# Patient Record
Sex: FEMALE | Race: WHITE | Employment: PART TIME
[De-identification: names, ages, dates, MRNs, and addresses within clinical notes are randomized per-mention and may not be internally consistent; named-entity substitution may affect disease eponyms.]

---

## 2017-02-23 ENCOUNTER — OFFICE VISIT (OUTPATIENT)
Facility: CLINIC | Age: 32
End: 2017-02-23

## 2017-02-23 ENCOUNTER — HOSPITAL ENCOUNTER (OUTPATIENT)
Dept: GENERAL RADIOLOGY | Facility: CLINIC | Age: 32
Discharge: HOME | End: 2017-02-23
Payer: COMMERCIAL

## 2017-02-23 ENCOUNTER — HOSPITAL ENCOUNTER (OUTPATIENT)
Facility: CLINIC | Age: 32
Discharge: HOME | End: 2017-02-23
Payer: COMMERCIAL

## 2017-02-23 VITALS
DIASTOLIC BLOOD PRESSURE: 60 MMHG | SYSTOLIC BLOOD PRESSURE: 84 MMHG | HEIGHT: 64 IN | WEIGHT: 123 LBS | TEMPERATURE: 97.8 F | BODY MASS INDEX: 21 KG/M2

## 2017-02-23 DIAGNOSIS — M25.551 RIGHT HIP PAIN: Primary | ICD-10-CM

## 2017-02-23 DIAGNOSIS — M25.551 RIGHT HIP PAIN: ICD-10-CM

## 2017-02-23 PROCEDURE — 73502 X-RAY EXAM HIP UNI 2-3 VIEWS: CPT

## 2017-02-23 PROCEDURE — 99213 OFFICE O/P EST LOW 20 MIN: CPT | Performed by: NURSE PRACTITIONER

## 2017-04-21 ENCOUNTER — HOSPITAL ENCOUNTER (OUTPATIENT)
Facility: CLINIC | Age: 32
Discharge: HOME | End: 2017-04-21
Payer: COMMERCIAL

## 2017-04-21 ENCOUNTER — HOSPITAL ENCOUNTER (OUTPATIENT)
Dept: GENERAL RADIOLOGY | Facility: CLINIC | Age: 32
Discharge: HOME | End: 2017-04-21
Payer: COMMERCIAL

## 2017-04-21 DIAGNOSIS — M54.41 ACUTE RIGHT-SIDED LOW BACK PAIN WITH RIGHT-SIDED SCIATICA: ICD-10-CM

## 2017-04-21 PROCEDURE — 72110 X-RAY EXAM L-2 SPINE 4/>VWS: CPT

## 2017-04-28 ENCOUNTER — HOSPITAL ENCOUNTER (OUTPATIENT)
Dept: MRI IMAGING | Facility: CLINIC | Age: 32
Discharge: HOME | End: 2017-04-28
Payer: COMMERCIAL

## 2017-04-28 DIAGNOSIS — M25.551 RIGHT HIP PAIN: ICD-10-CM

## 2017-04-28 PROCEDURE — 73721 MRI JNT OF LWR EXTRE W/O DYE: CPT

## 2017-05-16 ENCOUNTER — OFFICE VISIT (OUTPATIENT)
Dept: PODIATRY | Age: 32
End: 2017-05-16
Payer: COMMERCIAL

## 2017-05-16 VITALS
HEIGHT: 64 IN | WEIGHT: 121 LBS | HEART RATE: 72 BPM | DIASTOLIC BLOOD PRESSURE: 71 MMHG | SYSTOLIC BLOOD PRESSURE: 119 MMHG | BODY MASS INDEX: 20.66 KG/M2

## 2017-05-16 DIAGNOSIS — M84.351A STRESS FRACTURE OF RIGHT FEMUR, INITIAL ENCOUNTER: Primary | ICD-10-CM

## 2017-05-16 DIAGNOSIS — R26.9 ABNORMAL GAIT: ICD-10-CM

## 2017-05-16 DIAGNOSIS — M21.70 LOWER LIMB LENGTH DIFFERENCE: ICD-10-CM

## 2017-05-16 PROCEDURE — 99203 OFFICE O/P NEW LOW 30 MIN: CPT | Performed by: PODIATRIST

## 2017-05-18 ENCOUNTER — HOSPITAL ENCOUNTER (OUTPATIENT)
Age: 32
Setting detail: SPECIMEN
Discharge: HOME | End: 2017-05-18
Payer: COMMERCIAL

## 2017-05-18 DIAGNOSIS — M84.351A STRESS FRACTURE OF RIGHT FEMUR, INITIAL ENCOUNTER: ICD-10-CM

## 2017-05-18 LAB
CALCIUM SERPL-MCNC: 9 MG/DL (ref 8.6–10.4)
VITAMIN D 25-HYDROXY: 25.8 NG/ML (ref 30–100)

## 2017-05-19 ENCOUNTER — TELEPHONE (OUTPATIENT)
Dept: PODIATRY | Age: 32
End: 2017-05-19

## 2017-06-05 ENCOUNTER — OFFICE VISIT (OUTPATIENT)
Dept: ORTHOPEDIC SURGERY | Age: 32
End: 2017-06-05
Payer: COMMERCIAL

## 2017-06-05 VITALS
SYSTOLIC BLOOD PRESSURE: 118 MMHG | HEIGHT: 64 IN | HEART RATE: 70 BPM | OXYGEN SATURATION: 99 % | DIASTOLIC BLOOD PRESSURE: 74 MMHG | WEIGHT: 119 LBS | BODY MASS INDEX: 20.32 KG/M2

## 2017-06-05 DIAGNOSIS — M84.351A STRESS FRACTURE OF RIGHT FEMUR, INITIAL ENCOUNTER: Primary | ICD-10-CM

## 2017-06-05 PROCEDURE — 99203 OFFICE O/P NEW LOW 30 MIN: CPT | Performed by: FAMILY MEDICINE

## 2017-06-13 ENCOUNTER — HOSPITAL ENCOUNTER (OUTPATIENT)
Dept: PHYSICAL THERAPY | Facility: CLINIC | Age: 32
Setting detail: THERAPIES SERIES
Discharge: HOME | End: 2017-06-13
Payer: COMMERCIAL

## 2017-06-13 ENCOUNTER — TELEPHONE (OUTPATIENT)
Dept: ORTHOPEDIC SURGERY | Age: 32
End: 2017-06-13

## 2017-06-13 PROCEDURE — 97140 MANUAL THERAPY 1/> REGIONS: CPT

## 2017-06-13 PROCEDURE — 97161 PT EVAL LOW COMPLEX 20 MIN: CPT

## 2017-06-22 ENCOUNTER — HOSPITAL ENCOUNTER (OUTPATIENT)
Dept: PHYSICAL THERAPY | Facility: CLINIC | Age: 32
Setting detail: THERAPIES SERIES
Discharge: HOME | End: 2017-06-22
Payer: COMMERCIAL

## 2017-06-22 PROCEDURE — 97750 PHYSICAL PERFORMANCE TEST: CPT

## 2017-06-22 PROCEDURE — 97112 NEUROMUSCULAR REEDUCATION: CPT

## 2017-07-10 ENCOUNTER — HOSPITAL ENCOUNTER (OUTPATIENT)
Dept: PHYSICAL THERAPY | Facility: CLINIC | Age: 32
Setting detail: THERAPIES SERIES
Discharge: HOME | End: 2017-07-10
Payer: COMMERCIAL

## 2017-07-10 PROCEDURE — 97112 NEUROMUSCULAR REEDUCATION: CPT

## 2017-07-10 PROCEDURE — 97140 MANUAL THERAPY 1/> REGIONS: CPT

## 2017-07-18 ENCOUNTER — HOSPITAL ENCOUNTER (OUTPATIENT)
Dept: PHYSICAL THERAPY | Facility: CLINIC | Age: 32
Setting detail: THERAPIES SERIES
Discharge: HOME | End: 2017-07-18
Payer: COMMERCIAL

## 2017-07-18 PROCEDURE — 97110 THERAPEUTIC EXERCISES: CPT

## 2017-07-18 PROCEDURE — 97112 NEUROMUSCULAR REEDUCATION: CPT

## 2017-08-03 ENCOUNTER — HOSPITAL ENCOUNTER (OUTPATIENT)
Dept: PHYSICAL THERAPY | Facility: CLINIC | Age: 32
Setting detail: THERAPIES SERIES
Discharge: HOME | End: 2017-08-03
Payer: COMMERCIAL

## 2017-08-03 PROCEDURE — 97110 THERAPEUTIC EXERCISES: CPT

## 2017-08-15 ENCOUNTER — APPOINTMENT (OUTPATIENT)
Dept: PHYSICAL THERAPY | Facility: CLINIC | Age: 32
End: 2017-08-15
Payer: COMMERCIAL

## 2017-08-18 ENCOUNTER — APPOINTMENT (OUTPATIENT)
Dept: PHYSICAL THERAPY | Facility: CLINIC | Age: 32
End: 2017-08-18
Payer: COMMERCIAL

## 2017-08-29 ENCOUNTER — HOSPITAL ENCOUNTER (OUTPATIENT)
Dept: PHYSICAL THERAPY | Facility: CLINIC | Age: 32
Setting detail: THERAPIES SERIES
Discharge: HOME | End: 2017-08-29
Payer: COMMERCIAL

## 2017-08-29 PROCEDURE — 97140 MANUAL THERAPY 1/> REGIONS: CPT

## 2017-09-11 ENCOUNTER — HOSPITAL ENCOUNTER (OUTPATIENT)
Dept: PHYSICAL THERAPY | Facility: CLINIC | Age: 32
Setting detail: THERAPIES SERIES
Discharge: HOME | End: 2017-09-11
Payer: COMMERCIAL

## 2017-10-13 ENCOUNTER — HOSPITAL ENCOUNTER (OUTPATIENT)
Age: 32
Setting detail: SPECIMEN
Discharge: HOME | End: 2017-10-13
Payer: COMMERCIAL

## 2017-10-13 ENCOUNTER — OFFICE VISIT (OUTPATIENT)
Dept: OBGYN CLINIC | Age: 32
End: 2017-10-13
Payer: COMMERCIAL

## 2017-10-13 VITALS
WEIGHT: 120.6 LBS | DIASTOLIC BLOOD PRESSURE: 75 MMHG | SYSTOLIC BLOOD PRESSURE: 104 MMHG | BODY MASS INDEX: 20.59 KG/M2 | HEIGHT: 64 IN | HEART RATE: 74 BPM

## 2017-10-13 DIAGNOSIS — Z30.09 ENCOUNTER FOR EDUCATION ABOUT CONTRACEPTIVE USE: ICD-10-CM

## 2017-10-13 DIAGNOSIS — Z01.419 ENCOUNTER FOR WELL WOMAN EXAM WITH ROUTINE GYNECOLOGICAL EXAM: Primary | ICD-10-CM

## 2017-10-13 DIAGNOSIS — Z11.51 SCREENING FOR HPV (HUMAN PAPILLOMAVIRUS): ICD-10-CM

## 2017-10-13 PROCEDURE — 99395 PREV VISIT EST AGE 18-39: CPT | Performed by: ADVANCED PRACTICE MIDWIFE

## 2017-10-13 RX ORDER — DROSPIRENONE AND ETHINYL ESTRADIOL 0.03MG-3MG
1 KIT ORAL DAILY
Qty: 1 PACKET | Refills: 3 | Status: SHIPPED | OUTPATIENT
Start: 2017-10-13 | End: 2017-11-16 | Stop reason: SDUPTHER

## 2017-10-13 NOTE — PROGRESS NOTES
Subjective:  Krista Helton is a 34 y.o. female who presents for an annual exam.     HPI:  The patient has no complaints today. Preventative Health Screening:  The patient is sexually active. No new partners. last pap: was normal       HPV typing: unknown  Date   Regular exercise: yes Type:running, frequency: 3 times per week, Explored exercise options no, time is an issue  Ever been transfused or tattooed?no  The patient reports that domestic violence in her life is absent  Wears seatbelts: yes  Smoking: no  Alcohol use: occasionally  Recreational drug use: no  Happy with life: yes, satisfied with how she handles stress: yes, some issues w cycles, desires to work on this. Discussed OCP and will try for 3 weeks. Family planning choices: condoms  Desires pregnancy in the next year: uncertain if done or not  BRCA testing: no  Date of last DEXA:no  Date of last Mammogram: no  Colonscopy screening completed: no  History of gestational diabetes: no     Weight management: no issues  Diet review: no    Gynecological history:  OB History    Para Term  AB Living   3 2 2     2   SAB TAB Ectopic Molar Multiple Live Births             1      # Outcome Date GA Lbr Alex/2nd Weight Sex Delivery Anes PTL Lv   3             2 Term 11   8 lb 7 oz (3.827 kg) F Vag-Spont   CURTIS      Birth Comments: System Generated. Please review and update pregnancy details. 1 Term 11/29/10 41w2d 12:00 8 lb 5 oz (3.771 kg) M Vag-Spont EPI            Menarche age: teen      Cycle regular monthly, days 8-9, flow 4 heavy   Patient's last menstrual period was 2017. Menopause age: no                   Patient's medications, allergies, past medical, surgical, social and family histories were reviewed and updated as appropriate. Hormone Replacement: no    STD history: no      Review of Systems:  Constitutional: No fever, chills or malaise. No unexplained weight changes.   Head and eyes: No headache, dizziness or trauma. No visual changes. Wears eyewear yes  ENT: No hearing loss, tinnitus, sinus or taste problems. Hematologic: No lymphedema, Von Willebrands's, hemophilia or bruising. Psychological: No depression, suicidal thoughts, crying or anxiety. Breast: No skin changes, masses, mastalgia or discharge. Respiratory: No SOB, coughing or wheezing. Cardiovascular: No chest pain with exertion, palpitations, syncope or edema. Gastrointestinal: No heartburn, nausea or vomiting, bloody stools. No concerning change in bowel pattern. Genitourinary: No dysuria, hematuria, dyspareunia or abnormal bleeding  Musculoskeletal: No arthralgia, gout, or muscle weakness. Neurologic: No CVA, migraines, seizures, syncope or numbness. Dermatologic:  No rashes, itching or hives  Lymphatic:  No adneopathy    Objective:   /75 (Position: Sitting)   Pulse 74   Ht 5' 4\" (1.626 m)   Wt 120 lb 9.6 oz (54.7 kg)   LMP 09/13/2017   BMI 20.70 kg/m²     General Appearance:    Alert, cooperative, no distress, appears stated age   Head:    Normocephalic, without obvious abnormality, atraumatic   Eyes:    Conjunctiva/corneas clear   Ears:    Normal external ear canals, both ears   Nose:   Nares normal, septum midline, no drainage or sinus tenderness   Throat:   Lips, mucosa, and tongue normal; teeth and gums normal   Neck:   Supple, symmetrical, trachea midline, no adenopathy;     thyroid:  no enlargement/tenderness/nodules   Back:     Symmetric, no curvature, ROM normal, no CVA tenderness   Lungs:     Clear to auscultation bilaterally, respirations unlabored. No wheezes audible. Chest Wall:    No tenderness or deformity    Heart:    Regular rate and rhythm, S1 and S2 normal, no murmur, rub   or gallop   Breast Exam:    No tenderness, masses, or nipple abnormality. Breasts are symmetrical. Self breast exam reviewed. Abdomen:     Soft, non-tender, no masses, no organomegaly. No guarding or rebound. No rigidity.  No hernias

## 2017-10-18 LAB
HPV SAMPLE: NORMAL
HPV SOURCE: NORMAL
HPV, GENOTYPE 16: NOT DETECTED
HPV, GENOTYPE 18: NOT DETECTED
HPV, HIGH RISK OTHER: NOT DETECTED
HPV, INTERPRETATION: NORMAL

## 2017-10-19 LAB — CYTOLOGY REPORT: NORMAL

## 2017-11-16 DIAGNOSIS — Z30.09 ENCOUNTER FOR EDUCATION ABOUT CONTRACEPTIVE USE: ICD-10-CM

## 2017-11-16 RX ORDER — DROSPIRENONE AND ETHINYL ESTRADIOL 0.03MG-3MG
1 KIT ORAL DAILY
Qty: 3 PACKET | Refills: 3 | Status: SHIPPED | OUTPATIENT
Start: 2017-11-16 | End: 2018-01-09 | Stop reason: SDUPTHER

## 2017-11-28 ENCOUNTER — HOSPITAL ENCOUNTER (OUTPATIENT)
Age: 32
Setting detail: SPECIMEN
Discharge: HOME | End: 2017-11-28
Payer: COMMERCIAL

## 2017-11-28 DIAGNOSIS — Z00.00 WELL ADULT EXAM: ICD-10-CM

## 2017-11-28 LAB
ALBUMIN SERPL-MCNC: 4.4 G/DL (ref 3.5–5.2)
ALBUMIN/GLOBULIN RATIO: 1.5 (ref 1–2.5)
ALP BLD-CCNC: 28 U/L (ref 35–104)
ALT SERPL-CCNC: 16 U/L (ref 5–33)
ANION GAP SERPL CALCULATED.3IONS-SCNC: 12 MMOL/L (ref 9–17)
AST SERPL-CCNC: 17 U/L
BILIRUB SERPL-MCNC: 0.91 MG/DL (ref 0.3–1.2)
BUN BLDV-MCNC: 8 MG/DL (ref 6–20)
BUN/CREAT BLD: ABNORMAL (ref 9–20)
CALCIUM SERPL-MCNC: 9.2 MG/DL (ref 8.6–10.4)
CHLORIDE BLD-SCNC: 99 MMOL/L (ref 98–107)
CHOLESTEROL/HDL RATIO: 2.5
CHOLESTEROL: 152 MG/DL
CO2: 26 MMOL/L (ref 20–31)
CREAT SERPL-MCNC: 0.75 MG/DL (ref 0.5–0.9)
GFR AFRICAN AMERICAN: >60 ML/MIN
GFR NON-AFRICAN AMERICAN: >60 ML/MIN
GFR SERPL CREATININE-BSD FRML MDRD: ABNORMAL ML/MIN/{1.73_M2}
GFR SERPL CREATININE-BSD FRML MDRD: ABNORMAL ML/MIN/{1.73_M2}
GLUCOSE BLD-MCNC: 101 MG/DL (ref 70–99)
HCT VFR BLD CALC: 41.7 % (ref 36.3–47.1)
HDLC SERPL-MCNC: 61 MG/DL
HEMOGLOBIN: 13.6 G/DL (ref 11.9–15.1)
LDL CHOLESTEROL: 72 MG/DL (ref 0–130)
MCH RBC QN AUTO: 29.1 PG (ref 25.2–33.5)
MCHC RBC AUTO-ENTMCNC: 32.6 G/DL (ref 28.4–34.8)
MCV RBC AUTO: 89.3 FL (ref 82.6–102.9)
PDW BLD-RTO: 11.9 % (ref 11.8–14.4)
PLATELET # BLD: 248 K/UL (ref 138–453)
PMV BLD AUTO: 10.6 FL (ref 8.1–13.5)
POTASSIUM SERPL-SCNC: 3.8 MMOL/L (ref 3.7–5.3)
RBC # BLD: 4.67 M/UL (ref 3.95–5.11)
SODIUM BLD-SCNC: 137 MMOL/L (ref 135–144)
TOTAL PROTEIN: 7.3 G/DL (ref 6.4–8.3)
TRIGL SERPL-MCNC: 96 MG/DL
VLDLC SERPL CALC-MCNC: NORMAL MG/DL (ref 1–30)
WBC # BLD: 6.1 K/UL (ref 3.5–11.3)

## 2018-01-09 DIAGNOSIS — Z30.09 ENCOUNTER FOR EDUCATION ABOUT CONTRACEPTIVE USE: ICD-10-CM

## 2018-01-09 RX ORDER — DROSPIRENONE AND ETHINYL ESTRADIOL 0.03MG-3MG
1 KIT ORAL DAILY
Qty: 3 PACKET | Refills: 3 | Status: SHIPPED | OUTPATIENT
Start: 2018-01-09 | End: 2018-12-13 | Stop reason: SDUPTHER

## 2018-11-28 ENCOUNTER — HOSPITAL ENCOUNTER (OUTPATIENT)
Age: 33
Setting detail: SPECIMEN
Discharge: HOME | End: 2018-11-28
Payer: COMMERCIAL

## 2018-11-28 DIAGNOSIS — Z00.00 WELL ADULT EXAM: ICD-10-CM

## 2018-11-28 DIAGNOSIS — Z13.6 SCREENING FOR CARDIOVASCULAR CONDITION: ICD-10-CM

## 2018-11-28 LAB
ABSOLUTE EOS #: 0.22 K/UL (ref 0–0.44)
ABSOLUTE IMMATURE GRANULOCYTE: <0.03 K/UL (ref 0–0.3)
ABSOLUTE LYMPH #: 1.46 K/UL (ref 1.1–3.7)
ABSOLUTE MONO #: 0.57 K/UL (ref 0.1–1.2)
ALBUMIN SERPL-MCNC: 4.2 G/DL (ref 3.5–5.2)
ALBUMIN/GLOBULIN RATIO: 1.4 (ref 1–2.5)
ALP BLD-CCNC: 35 U/L (ref 35–104)
ALT SERPL-CCNC: 16 U/L (ref 5–33)
ANION GAP SERPL CALCULATED.3IONS-SCNC: 10 MMOL/L (ref 9–17)
AST SERPL-CCNC: 19 U/L
BASOPHILS # BLD: 1 % (ref 0–2)
BASOPHILS ABSOLUTE: 0.04 K/UL (ref 0–0.2)
BILIRUB SERPL-MCNC: 0.85 MG/DL (ref 0.3–1.2)
BUN BLDV-MCNC: 8 MG/DL (ref 6–20)
BUN/CREAT BLD: NORMAL (ref 9–20)
CALCIUM SERPL-MCNC: 9.1 MG/DL (ref 8.6–10.4)
CHLORIDE BLD-SCNC: 101 MMOL/L (ref 98–107)
CHOLESTEROL/HDL RATIO: 2.5
CHOLESTEROL: 147 MG/DL
CO2: 27 MMOL/L (ref 20–31)
CREAT SERPL-MCNC: 0.7 MG/DL (ref 0.5–0.9)
DIFFERENTIAL TYPE: ABNORMAL
EOSINOPHILS RELATIVE PERCENT: 3 % (ref 1–4)
GFR AFRICAN AMERICAN: >60 ML/MIN
GFR NON-AFRICAN AMERICAN: >60 ML/MIN
GFR SERPL CREATININE-BSD FRML MDRD: NORMAL ML/MIN/{1.73_M2}
GFR SERPL CREATININE-BSD FRML MDRD: NORMAL ML/MIN/{1.73_M2}
GLUCOSE BLD-MCNC: 99 MG/DL (ref 70–99)
HCT VFR BLD CALC: 39.3 % (ref 36.3–47.1)
HDLC SERPL-MCNC: 59 MG/DL
HEMOGLOBIN: 12.8 G/DL (ref 11.9–15.1)
IMMATURE GRANULOCYTES: 0 %
LDL CHOLESTEROL: 64 MG/DL (ref 0–130)
LYMPHOCYTES # BLD: 22 % (ref 24–43)
MCH RBC QN AUTO: 29.2 PG (ref 25.2–33.5)
MCHC RBC AUTO-ENTMCNC: 32.6 G/DL (ref 28.4–34.8)
MCV RBC AUTO: 89.7 FL (ref 82.6–102.9)
MONOCYTES # BLD: 9 % (ref 3–12)
NRBC AUTOMATED: 0 PER 100 WBC
PDW BLD-RTO: 11.8 % (ref 11.8–14.4)
PLATELET # BLD: 240 K/UL (ref 138–453)
PLATELET ESTIMATE: ABNORMAL
PMV BLD AUTO: 10.8 FL (ref 8.1–13.5)
POTASSIUM SERPL-SCNC: 4.5 MMOL/L (ref 3.7–5.3)
RBC # BLD: 4.38 M/UL (ref 3.95–5.11)
RBC # BLD: ABNORMAL 10*6/UL
SEG NEUTROPHILS: 65 % (ref 36–65)
SEGMENTED NEUTROPHILS ABSOLUTE COUNT: 4.22 K/UL (ref 1.5–8.1)
SODIUM BLD-SCNC: 138 MMOL/L (ref 135–144)
TOTAL PROTEIN: 7.2 G/DL (ref 6.4–8.3)
TRIGL SERPL-MCNC: 118 MG/DL
VLDLC SERPL CALC-MCNC: NORMAL MG/DL (ref 1–30)
WBC # BLD: 6.5 K/UL (ref 3.5–11.3)
WBC # BLD: ABNORMAL 10*3/UL

## 2018-12-13 DIAGNOSIS — Z30.09 ENCOUNTER FOR EDUCATION ABOUT CONTRACEPTIVE USE: ICD-10-CM

## 2018-12-14 RX ORDER — DROSPIRENONE AND ETHINYL ESTRADIOL 0.03MG-3MG
KIT ORAL
Qty: 84 TABLET | Refills: 0 | Status: SHIPPED | OUTPATIENT
Start: 2018-12-14 | End: 2019-04-11 | Stop reason: ALTCHOICE

## 2019-04-11 ENCOUNTER — OFFICE VISIT (OUTPATIENT)
Dept: OBGYN CLINIC | Age: 34
End: 2019-04-11
Payer: COMMERCIAL

## 2019-04-11 VITALS
WEIGHT: 123 LBS | DIASTOLIC BLOOD PRESSURE: 67 MMHG | HEIGHT: 64 IN | HEART RATE: 84 BPM | BODY MASS INDEX: 21 KG/M2 | SYSTOLIC BLOOD PRESSURE: 114 MMHG

## 2019-04-11 DIAGNOSIS — N94.6 DYSMENORRHEA: ICD-10-CM

## 2019-04-11 DIAGNOSIS — Z01.419 WOMEN'S ANNUAL ROUTINE GYNECOLOGICAL EXAMINATION: Primary | ICD-10-CM

## 2019-04-11 PROCEDURE — 99395 PREV VISIT EST AGE 18-39: CPT | Performed by: ADVANCED PRACTICE MIDWIFE

## 2019-04-11 RX ORDER — DROSPIRENONE AND ETHINYL ESTRADIOL 0.03MG-3MG
1 KIT ORAL DAILY
Qty: 3 PACKET | Refills: 3 | Status: SHIPPED | OUTPATIENT
Start: 2019-04-11 | End: 2019-10-22

## 2019-04-11 ASSESSMENT — ENCOUNTER SYMPTOMS
SHORTNESS OF BREATH: 0
DIARRHEA: 0
ABDOMINAL PAIN: 0
NAUSEA: 0
VOMITING: 0

## 2019-04-11 NOTE — PROGRESS NOTES
[address]  [facility]  [address]  [address]  Dept: [phone]    Patient Name: Mehdi More  Patient Age: 27 y.o. Date of Visit: 4/11/2019    Subjective  Chief Complaint   Patient presents with    Gynecologic Exam     last pap 10/13/17 wnl hpv neg     Patient's last menstrual period was 04/02/2019. Arrives for annual with daughter. Doing well. No breast complaints  Reports heavy painful periods. Would like to go back on OCP. Reports previously been on OCP without concerns. Reports unsure if desires more children. Reports sexually active with on male sex partner () reports no concerns with sex    She reports there is a personal history or family history of:    Smoking (> 15 cigs/day): No    Migraine with Aura:  No    HTN (> 160/100): No    DVT:  No    Thrombophilias:  No    Stroke (CVA): No     Ischemic heart disease:  No    Valvular heart disease (A Fib, Pul HTN, etc): No    Positive Antiphospholipid Abs:  No    Liver Disease:  No        Review of Systems   Constitutional: Negative for unexpected weight change. Respiratory: Negative for shortness of breath. Cardiovascular: Negative for chest pain, palpitations and leg swelling. Gastrointestinal: Negative for abdominal pain, diarrhea, nausea and vomiting. Genitourinary: Positive for menstrual problem. Negative for difficulty urinating, dyspareunia, vaginal discharge and vaginal pain. Neurological: Negative for dizziness, light-headedness and headaches.        Preventive Health Screening:   Date of last pap: 2017 normal               HPV typing/date: 2017  positive    History of Gestational Diabetes: No     If Yes, Glucose screening ordered:No    Preventive screening: No    Family history of Breast, Ovarian, Colon or Uterine Cancer:  no     If Yes see scanned worksheet    Objective  /67 (Site: Right Upper Arm, Position: Sitting, Cuff Size: Medium Adult)   Pulse 84   Ht 5' 4\" (1.626 m)   Wt 123 lb (55.8 kg)   LMP 04/02/2019   BMI 21.11 kg/m²     Gynecologic History  Menarche: 11  Monthly menses (days): irregular   Length: 7  Flow: heavy      Sexually active: Yes  New Sex Partner within 3 months: No  Domestic Violence Screening: negative    STD history: No     Birth control method :Yes ocp      Physical Exam   Constitutional: She is oriented to person, place, and time. She appears well-developed and well-nourished. No distress. Neck: Normal range of motion. No thyromegaly present. Cardiovascular: Normal rate and regular rhythm. Pulmonary/Chest: Effort normal and breath sounds normal. No respiratory distress. Genitourinary:   Genitourinary Comments: deferred   Musculoskeletal: Normal range of motion. Neurological: She is alert and oriented to person, place, and time. Skin: Skin is warm and dry. She is not diaphoretic. Psychiatric: She has a normal mood and affect. Her behavior is normal. Judgment and thought content normal.   Vitals reviewed. Assessment & Plan  1. Women's annual routine gynecological examination  -Pap per ASCCP guideline  -Mammogram at 39  -Recommended seeing PCP yearly  -Reviewed if stopping OCP to conceive to start folic acid    2. Dysmenorrhea  -Reviewed quick start, starting with next menses, or Sunday after next menses, discussed using protection x7 days after starting OCP if using quick start of Sunday start. Patient was educated to notify office and seek medical care if abdominal pain, chest pain, severe headaches, eye problems/loss of vision, or severe leg pain or swelling in the calf occurs (ACHES). - ELISEO 28 3-0.03 MG TABS; Take 1 tablet by mouth daily  Dispense: 3 packet; Refill: 3      Return in about 1 year (around 4/11/2020) for Annual.    Patient was seen with total face to face time of 25 minutes. More than 50% of this visit was on counseling and education regardingher diagnoses and her options.  She was also counseled on her preventative health maintenance recommendations and follow-up.     Electronically Signed by Kade Mello

## 2019-04-18 ENCOUNTER — TELEPHONE (OUTPATIENT)
Dept: OBGYN CLINIC | Age: 34
End: 2019-04-18

## 2019-04-29 ENCOUNTER — HOSPITAL ENCOUNTER (EMERGENCY)
Age: 34
Discharge: HOME | End: 2019-04-29
Attending: EMERGENCY MEDICINE
Payer: COMMERCIAL

## 2019-04-29 ENCOUNTER — APPOINTMENT (OUTPATIENT)
Dept: CT IMAGING | Age: 34
End: 2019-04-29
Payer: COMMERCIAL

## 2019-04-29 VITALS
RESPIRATION RATE: 18 BRPM | DIASTOLIC BLOOD PRESSURE: 52 MMHG | SYSTOLIC BLOOD PRESSURE: 99 MMHG | HEIGHT: 64 IN | HEART RATE: 68 BPM | BODY MASS INDEX: 20.83 KG/M2 | TEMPERATURE: 98.4 F | WEIGHT: 122 LBS | OXYGEN SATURATION: 99 %

## 2019-04-29 DIAGNOSIS — N83.201 CYST OF RIGHT OVARY: ICD-10-CM

## 2019-04-29 DIAGNOSIS — R10.31 RIGHT LOWER QUADRANT ABDOMINAL PAIN: Primary | ICD-10-CM

## 2019-04-29 LAB
-: ABNORMAL
ABSOLUTE EOS #: 0.2 K/UL (ref 0–0.4)
ABSOLUTE IMMATURE GRANULOCYTE: ABNORMAL K/UL (ref 0–0.3)
ABSOLUTE LYMPH #: 1.2 K/UL (ref 1–4.8)
ABSOLUTE MONO #: 0.7 K/UL (ref 0.1–1.3)
ALBUMIN SERPL-MCNC: 4.6 G/DL (ref 3.5–5.2)
ALBUMIN/GLOBULIN RATIO: ABNORMAL (ref 1–2.5)
ALP BLD-CCNC: 37 U/L (ref 35–104)
ALT SERPL-CCNC: 19 U/L (ref 5–33)
AMORPHOUS: ABNORMAL
ANION GAP SERPL CALCULATED.3IONS-SCNC: 14 MMOL/L (ref 9–17)
AST SERPL-CCNC: 20 U/L
BACTERIA: ABNORMAL
BASOPHILS # BLD: 0 % (ref 0–2)
BASOPHILS ABSOLUTE: 0 K/UL (ref 0–0.2)
BILIRUB SERPL-MCNC: 0.76 MG/DL (ref 0.3–1.2)
BILIRUBIN URINE: NEGATIVE
BUN BLDV-MCNC: 7 MG/DL (ref 6–20)
BUN/CREAT BLD: ABNORMAL (ref 9–20)
CALCIUM SERPL-MCNC: 8.7 MG/DL (ref 8.6–10.4)
CASTS UA: ABNORMAL /LPF
CHLORIDE BLD-SCNC: 102 MMOL/L (ref 98–107)
CO2: 21 MMOL/L (ref 20–31)
COLOR: YELLOW
COMMENT UA: ABNORMAL
CREAT SERPL-MCNC: 0.6 MG/DL (ref 0.5–0.9)
CRYSTALS, UA: ABNORMAL /HPF
CRYSTALS, UA: ABNORMAL /HPF
DIFFERENTIAL TYPE: ABNORMAL
EOSINOPHILS RELATIVE PERCENT: 2 % (ref 0–4)
EPITHELIAL CELLS UA: ABNORMAL /HPF
GFR AFRICAN AMERICAN: >60 ML/MIN
GFR NON-AFRICAN AMERICAN: >60 ML/MIN
GFR SERPL CREATININE-BSD FRML MDRD: ABNORMAL ML/MIN/{1.73_M2}
GFR SERPL CREATININE-BSD FRML MDRD: ABNORMAL ML/MIN/{1.73_M2}
GLUCOSE BLD-MCNC: 126 MG/DL (ref 70–99)
GLUCOSE URINE: NEGATIVE
HCG QUALITATIVE: NEGATIVE
HCT VFR BLD CALC: 43.3 % (ref 36–46)
HEMOGLOBIN: 14.6 G/DL (ref 12–16)
IMMATURE GRANULOCYTES: ABNORMAL %
KETONES, URINE: NEGATIVE
LEUKOCYTE ESTERASE, URINE: NEGATIVE
LIPASE: 35 U/L (ref 13–60)
LYMPHOCYTES # BLD: 12 % (ref 24–44)
MCH RBC QN AUTO: 29.6 PG (ref 26–34)
MCHC RBC AUTO-ENTMCNC: 33.6 G/DL (ref 31–37)
MCV RBC AUTO: 88.1 FL (ref 80–100)
MONOCYTES # BLD: 7 % (ref 1–7)
MUCUS: ABNORMAL
NITRITE, URINE: NEGATIVE
NRBC AUTOMATED: ABNORMAL PER 100 WBC
OTHER OBSERVATIONS UA: ABNORMAL
PDW BLD-RTO: 12.6 % (ref 11.5–14.9)
PH UA: 5 (ref 5–8)
PLATELET # BLD: 249 K/UL (ref 150–450)
PLATELET ESTIMATE: ABNORMAL
PMV BLD AUTO: 8.5 FL (ref 6–12)
POTASSIUM SERPL-SCNC: 3.6 MMOL/L (ref 3.7–5.3)
PROTEIN UA: NEGATIVE
RBC # BLD: 4.91 M/UL (ref 4–5.2)
RBC # BLD: ABNORMAL 10*6/UL
RBC UA: ABNORMAL /HPF
RENAL EPITHELIAL, UA: ABNORMAL /HPF
SEG NEUTROPHILS: 79 % (ref 36–66)
SEGMENTED NEUTROPHILS ABSOLUTE COUNT: 8.4 K/UL (ref 1.3–9.1)
SODIUM BLD-SCNC: 137 MMOL/L (ref 135–144)
SPECIFIC GRAVITY UA: 1.02 (ref 1–1.03)
TOTAL PROTEIN: 7.6 G/DL (ref 6.4–8.3)
TRICHOMONAS: ABNORMAL
TURBIDITY: ABNORMAL
URINE HGB: NEGATIVE
UROBILINOGEN, URINE: NORMAL
WBC # BLD: 10.5 K/UL (ref 3.5–11)
WBC # BLD: ABNORMAL 10*3/UL
WBC UA: ABNORMAL /HPF
YEAST: ABNORMAL

## 2019-04-29 PROCEDURE — 81001 URINALYSIS AUTO W/SCOPE: CPT

## 2019-04-29 PROCEDURE — 96374 THER/PROPH/DIAG INJ IV PUSH: CPT

## 2019-04-29 PROCEDURE — 84703 CHORIONIC GONADOTROPIN ASSAY: CPT

## 2019-04-29 PROCEDURE — 85025 COMPLETE CBC W/AUTO DIFF WBC: CPT

## 2019-04-29 PROCEDURE — 99284 EMERGENCY DEPT VISIT MOD MDM: CPT

## 2019-04-29 PROCEDURE — 6360000004 HC RX CONTRAST MEDICATION: Performed by: EMERGENCY MEDICINE

## 2019-04-29 PROCEDURE — 2580000003 HC RX 258: Performed by: EMERGENCY MEDICINE

## 2019-04-29 PROCEDURE — 36415 COLL VENOUS BLD VENIPUNCTURE: CPT

## 2019-04-29 PROCEDURE — 6360000002 HC RX W HCPCS: Performed by: EMERGENCY MEDICINE

## 2019-04-29 PROCEDURE — 83690 ASSAY OF LIPASE: CPT

## 2019-04-29 PROCEDURE — 74177 CT ABD & PELVIS W/CONTRAST: CPT

## 2019-04-29 PROCEDURE — 80053 COMPREHEN METABOLIC PANEL: CPT

## 2019-04-29 RX ORDER — 0.9 % SODIUM CHLORIDE 0.9 %
80 INTRAVENOUS SOLUTION INTRAVENOUS ONCE
Status: COMPLETED | OUTPATIENT
Start: 2019-04-29 | End: 2019-04-29

## 2019-04-29 RX ORDER — ONDANSETRON 2 MG/ML
4 INJECTION INTRAMUSCULAR; INTRAVENOUS ONCE
Status: COMPLETED | OUTPATIENT
Start: 2019-04-29 | End: 2019-04-29

## 2019-04-29 RX ORDER — DICYCLOMINE HYDROCHLORIDE 10 MG/1
10 CAPSULE ORAL EVERY 6 HOURS PRN
Qty: 20 CAPSULE | Refills: 0 | Status: SHIPPED | OUTPATIENT
Start: 2019-04-29 | End: 2019-10-22

## 2019-04-29 RX ORDER — 0.9 % SODIUM CHLORIDE 0.9 %
1000 INTRAVENOUS SOLUTION INTRAVENOUS ONCE
Status: COMPLETED | OUTPATIENT
Start: 2019-04-29 | End: 2019-04-29

## 2019-04-29 RX ORDER — ONDANSETRON HYDROCHLORIDE 8 MG/1
8 TABLET, FILM COATED ORAL EVERY 8 HOURS PRN
Qty: 20 TABLET | Refills: 0 | Status: SHIPPED | OUTPATIENT
Start: 2019-04-29 | End: 2019-10-22

## 2019-04-29 RX ORDER — SODIUM CHLORIDE 0.9 % (FLUSH) 0.9 %
10 SYRINGE (ML) INJECTION PRN
Status: DISCONTINUED | OUTPATIENT
Start: 2019-04-29 | End: 2019-04-29 | Stop reason: HOSPADM

## 2019-04-29 RX ADMIN — SODIUM CHLORIDE 80 ML: 9 INJECTION, SOLUTION INTRAVENOUS at 08:41

## 2019-04-29 RX ADMIN — ONDANSETRON 4 MG: 2 INJECTION INTRAMUSCULAR; INTRAVENOUS at 07:29

## 2019-04-29 RX ADMIN — SODIUM CHLORIDE 1000 ML: 9 INJECTION, SOLUTION INTRAVENOUS at 07:23

## 2019-04-29 RX ADMIN — IOVERSOL 75 ML: 741 INJECTION INTRA-ARTERIAL; INTRAVENOUS at 08:41

## 2019-04-29 RX ADMIN — Medication 10 ML: at 08:41

## 2019-04-29 ASSESSMENT — ENCOUNTER SYMPTOMS
COUGH: 0
SHORTNESS OF BREATH: 0
BLOOD IN STOOL: 0
ABDOMINAL PAIN: 1
ABDOMINAL DISTENTION: 0
CONSTIPATION: 0
DIARRHEA: 1
VOMITING: 1
NAUSEA: 1

## 2019-04-29 ASSESSMENT — PAIN DESCRIPTION - LOCATION: LOCATION: ABDOMEN

## 2019-04-29 ASSESSMENT — PAIN SCALES - GENERAL: PAINLEVEL_OUTOF10: 7

## 2019-04-29 ASSESSMENT — PAIN DESCRIPTION - DESCRIPTORS: DESCRIPTORS: ACHING

## 2019-04-29 ASSESSMENT — PAIN DESCRIPTION - ORIENTATION: ORIENTATION: RIGHT

## 2019-04-29 ASSESSMENT — PAIN DESCRIPTION - FREQUENCY: FREQUENCY: CONTINUOUS

## 2019-04-29 NOTE — ED NOTES
Pt nausea subsided, pt given warm blankets for comfort, updated on POC.      Claude Abreu RN  04/29/19 [id number]

## 2019-04-29 NOTE — ED PROVIDER NOTES
 WISDOM TOOTH EXTRACTION         CURRENT MEDICATIONS       Discharge Medication List as of 4/29/2019  9:55 AM      CONTINUE these medications which have NOT CHANGED    Details   ELISEO 28 3-0.03 MG TABS Take 1 tablet by mouth daily, Disp-3 packet, R-3, DAWNormal      Calcium-Magnesium-Vitamin D (CALCIUM 500 PO) Take by mouthHistorical Med      Cholecalciferol (VITAMIN D PO) Take by mouthHistorical Med             ALLERGIES     has No Known Allergies. FAMILY HISTORY     indicated that the status of her mother is unknown. She indicated that the status of her father is unknown. She indicated that the status of her maternal grandmother is unknown. She indicated that the status of her paternal grandmother is unknown. She indicated that the status of her paternal grandfather is unknown. She indicated that the status of her maternal uncle is unknown. SOCIAL HISTORY      reports that she has never smoked. She has never used smokeless tobacco. She reports that she drinks alcohol. She reports that she does not use drugs. PHYSICAL EXAM     INITIAL VITALS: BP (!) 99/52   Pulse 68   Temp 98.4 °F (36.9 °C) (Oral)   Resp 18   Ht 5' 4\" (1.626 m)   Wt 122 lb (55.3 kg)   LMP 04/02/2019   SpO2 99%   BMI 20.94 kg/m²   Gen. : Appears uncomfortable   Head: Normocephalic, atraumatic  Eye: Pupils equal round reactive to light, no conjunctivitis  ENT: MMM   Heart: Regular rate and rhythm no murmurs  Lungs: Clear to auscultation bilaterally, no respiratory distress  Chest wall: No crepitus, no tenderness palpation  Abdomen: Soft,RLQ TTPended, with no peritoneal signs  MSK: No CVA TTP   Neurologic: Patient is alert and oriented x3, motor and sensation is intact in all 4 extremitfluent speech, ambulatory with a normal gaitExtremities: Full range of motion, no cyanosis, no edema, no signs of trauma, no tenderness to palpation    MEDICAL DECISION MAKING:     MDM  This is a 50-year-old female presenting with right lower

## 2019-10-22 ENCOUNTER — HOSPITAL ENCOUNTER (OUTPATIENT)
Age: 34
Setting detail: SPECIMEN
Discharge: HOME | End: 2019-10-22
Payer: COMMERCIAL

## 2019-10-22 DIAGNOSIS — Z13.9 SCREENING FOR CONDITION: ICD-10-CM

## 2019-10-22 LAB
ALBUMIN SERPL-MCNC: 4.6 G/DL (ref 3.5–5.2)
ALBUMIN/GLOBULIN RATIO: 1.6 (ref 1–2.5)
ALP BLD-CCNC: 34 U/L (ref 35–104)
ALT SERPL-CCNC: 14 U/L (ref 5–33)
ANION GAP SERPL CALCULATED.3IONS-SCNC: 14 MMOL/L (ref 9–17)
AST SERPL-CCNC: 20 U/L
BILIRUB SERPL-MCNC: 1.23 MG/DL (ref 0.3–1.2)
BUN BLDV-MCNC: 10 MG/DL (ref 6–20)
BUN/CREAT BLD: ABNORMAL (ref 9–20)
CALCIUM SERPL-MCNC: 9.6 MG/DL (ref 8.6–10.4)
CHLORIDE BLD-SCNC: 104 MMOL/L (ref 98–107)
CHOLESTEROL/HDL RATIO: 2.4
CHOLESTEROL: 159 MG/DL
CO2: 23 MMOL/L (ref 20–31)
CREAT SERPL-MCNC: 0.65 MG/DL (ref 0.5–0.9)
GFR AFRICAN AMERICAN: >60 ML/MIN
GFR NON-AFRICAN AMERICAN: >60 ML/MIN
GFR SERPL CREATININE-BSD FRML MDRD: ABNORMAL ML/MIN/{1.73_M2}
GFR SERPL CREATININE-BSD FRML MDRD: ABNORMAL ML/MIN/{1.73_M2}
GLUCOSE BLD-MCNC: 97 MG/DL (ref 70–99)
HCT VFR BLD CALC: 40.7 % (ref 36.3–47.1)
HDLC SERPL-MCNC: 67 MG/DL
HEMOGLOBIN: 13.3 G/DL (ref 11.9–15.1)
LDL CHOLESTEROL: 84 MG/DL (ref 0–130)
MCH RBC QN AUTO: 30.1 PG (ref 25.2–33.5)
MCHC RBC AUTO-ENTMCNC: 32.7 G/DL (ref 28.4–34.8)
MCV RBC AUTO: 92.1 FL (ref 82.6–102.9)
NRBC AUTOMATED: 0 PER 100 WBC
PDW BLD-RTO: 12.1 % (ref 11.8–14.4)
PLATELET # BLD: 233 K/UL (ref 138–453)
PMV BLD AUTO: 10.7 FL (ref 8.1–13.5)
POTASSIUM SERPL-SCNC: 4.7 MMOL/L (ref 3.7–5.3)
RBC # BLD: 4.42 M/UL (ref 3.95–5.11)
SODIUM BLD-SCNC: 141 MMOL/L (ref 135–144)
TOTAL PROTEIN: 7.5 G/DL (ref 6.4–8.3)
TRIGL SERPL-MCNC: 38 MG/DL
VLDLC SERPL CALC-MCNC: NORMAL MG/DL (ref 1–30)
WBC # BLD: 6.1 K/UL (ref 3.5–11.3)

## 2020-10-23 ENCOUNTER — HOSPITAL ENCOUNTER (OUTPATIENT)
Age: 35
Setting detail: SPECIMEN
Discharge: HOME | End: 2020-10-23
Payer: COMMERCIAL

## 2020-10-23 LAB
ABSOLUTE EOS #: 0.22 K/UL (ref 0–0.44)
ABSOLUTE IMMATURE GRANULOCYTE: <0.03 K/UL (ref 0–0.3)
ABSOLUTE LYMPH #: 1.73 K/UL (ref 1.1–3.7)
ABSOLUTE MONO #: 0.49 K/UL (ref 0.1–1.2)
ALBUMIN SERPL-MCNC: 4.3 G/DL (ref 3.5–5.2)
ALBUMIN/GLOBULIN RATIO: 1.8 (ref 1–2.5)
ALP BLD-CCNC: 35 U/L (ref 35–104)
ALT SERPL-CCNC: 14 U/L (ref 5–33)
ANION GAP SERPL CALCULATED.3IONS-SCNC: 10 MMOL/L (ref 9–17)
AST SERPL-CCNC: 23 U/L
BASOPHILS # BLD: 1 % (ref 0–2)
BASOPHILS ABSOLUTE: 0.05 K/UL (ref 0–0.2)
BILIRUB SERPL-MCNC: 1.61 MG/DL (ref 0.3–1.2)
BUN BLDV-MCNC: 9 MG/DL (ref 6–20)
BUN/CREAT BLD: ABNORMAL (ref 9–20)
CALCIUM SERPL-MCNC: 9.2 MG/DL (ref 8.6–10.4)
CHLORIDE BLD-SCNC: 104 MMOL/L (ref 98–107)
CHOLESTEROL, FASTING: 138 MG/DL
CHOLESTEROL/HDL RATIO: 2.2
CO2: 26 MMOL/L (ref 20–31)
CREAT SERPL-MCNC: 0.72 MG/DL (ref 0.5–0.9)
DIFFERENTIAL TYPE: ABNORMAL
EOSINOPHILS RELATIVE PERCENT: 5 % (ref 1–4)
GFR AFRICAN AMERICAN: >60 ML/MIN
GFR NON-AFRICAN AMERICAN: >60 ML/MIN
GFR SERPL CREATININE-BSD FRML MDRD: ABNORMAL ML/MIN/{1.73_M2}
GFR SERPL CREATININE-BSD FRML MDRD: ABNORMAL ML/MIN/{1.73_M2}
GLUCOSE BLD-MCNC: 104 MG/DL (ref 70–99)
HCT VFR BLD CALC: 37.5 % (ref 36.3–47.1)
HDLC SERPL-MCNC: 63 MG/DL
HEMOGLOBIN: 12.3 G/DL (ref 11.9–15.1)
IMMATURE GRANULOCYTES: 0 %
LDL CHOLESTEROL: 66 MG/DL (ref 0–130)
LYMPHOCYTES # BLD: 36 % (ref 24–43)
MCH RBC QN AUTO: 29.9 PG (ref 25.2–33.5)
MCHC RBC AUTO-ENTMCNC: 32.8 G/DL (ref 28.4–34.8)
MCV RBC AUTO: 91 FL (ref 82.6–102.9)
MONOCYTES # BLD: 10 % (ref 3–12)
NRBC AUTOMATED: 0 PER 100 WBC
PDW BLD-RTO: 11.8 % (ref 11.8–14.4)
PLATELET # BLD: 220 K/UL (ref 138–453)
PLATELET ESTIMATE: ABNORMAL
PMV BLD AUTO: 10.5 FL (ref 8.1–13.5)
POTASSIUM SERPL-SCNC: 4.2 MMOL/L (ref 3.7–5.3)
RBC # BLD: 4.12 M/UL (ref 3.95–5.11)
RBC # BLD: ABNORMAL 10*6/UL
SEG NEUTROPHILS: 48 % (ref 36–65)
SEGMENTED NEUTROPHILS ABSOLUTE COUNT: 2.33 K/UL (ref 1.5–8.1)
SODIUM BLD-SCNC: 140 MMOL/L (ref 135–144)
TOTAL PROTEIN: 6.7 G/DL (ref 6.4–8.3)
TRIGLYCERIDE, FASTING: 46 MG/DL
VLDLC SERPL CALC-MCNC: NORMAL MG/DL (ref 1–30)
WBC # BLD: 4.8 K/UL (ref 3.5–11.3)
WBC # BLD: ABNORMAL 10*3/UL

## 2021-01-14 ENCOUNTER — OFFICE VISIT (OUTPATIENT)
Dept: OBGYN CLINIC | Age: 36
End: 2021-01-14
Payer: COMMERCIAL

## 2021-01-14 VITALS
DIASTOLIC BLOOD PRESSURE: 74 MMHG | BODY MASS INDEX: 23.93 KG/M2 | SYSTOLIC BLOOD PRESSURE: 110 MMHG | WEIGHT: 121.9 LBS | HEIGHT: 60 IN | HEART RATE: 80 BPM

## 2021-01-14 DIAGNOSIS — Z01.419 WELL WOMAN EXAM: Primary | ICD-10-CM

## 2021-01-14 DIAGNOSIS — N94.6 DYSMENORRHEA: ICD-10-CM

## 2021-01-14 PROCEDURE — 99395 PREV VISIT EST AGE 18-39: CPT | Performed by: ADVANCED PRACTICE MIDWIFE

## 2021-01-14 RX ORDER — ETONOGESTREL AND ETHINYL ESTRADIOL 11.7; 2.7 MG/1; MG/1
1 INSERT, EXTENDED RELEASE VAGINAL
Qty: 3 EACH | Refills: 3 | Status: SHIPPED | OUTPATIENT
Start: 2021-01-14 | End: 2021-03-05 | Stop reason: SDUPTHER

## 2021-01-14 SDOH — ECONOMIC STABILITY: TRANSPORTATION INSECURITY
IN THE PAST 12 MONTHS, HAS THE LACK OF TRANSPORTATION KEPT YOU FROM MEDICAL APPOINTMENTS OR FROM GETTING MEDICATIONS?: NO

## 2021-01-14 SDOH — ECONOMIC STABILITY: FOOD INSECURITY: WITHIN THE PAST 12 MONTHS, YOU WORRIED THAT YOUR FOOD WOULD RUN OUT BEFORE YOU GOT MONEY TO BUY MORE.: NEVER TRUE

## 2021-01-14 SDOH — ECONOMIC STABILITY: FOOD INSECURITY: WITHIN THE PAST 12 MONTHS, THE FOOD YOU BOUGHT JUST DIDN'T LAST AND YOU DIDN'T HAVE MONEY TO GET MORE.: NEVER TRUE

## 2021-01-14 ASSESSMENT — ENCOUNTER SYMPTOMS
VOMITING: 0
NAUSEA: 0
SHORTNESS OF BREATH: 0
DIARRHEA: 0
ABDOMINAL PAIN: 0

## 2021-01-14 ASSESSMENT — PATIENT HEALTH QUESTIONNAIRE - PHQ9
SUM OF ALL RESPONSES TO PHQ QUESTIONS 1-9: 0
SUM OF ALL RESPONSES TO PHQ QUESTIONS 1-9: 0
1. LITTLE INTEREST OR PLEASURE IN DOING THINGS: 0

## 2021-01-14 NOTE — PROGRESS NOTES
[facility] [address]  [address]  Dept: [phone]    Patient Name: Tisha Laurent  Patient Age: 38 y.o. Date of Visit: 1/14/2021    Subjective  Chief Complaint   Patient presents with    Gynecologic Exam     prev. pap 10-13-17 satis/neg    Menstrual Problem     Patient's last menstrual period was 12/26/2020 (exact date). HPI  Patient arrives for annual exam.  Patient admits to concerns today. Concerns include severe PMS symptoms which is a new onset in the last year which include GI update with n/v/d day prior to starting that resolve after she starts. Patient reports is  sexually active with 1 male partner(s). Patient denies  pain with sex . Patient denies a history of sexually transmitted infection(s). Patient does not want screening for sexually transmitted infection(s). Reports is not on contraception           Review of Systems   Constitutional: Negative for unexpected weight change. Respiratory: Negative for shortness of breath. Cardiovascular: Negative for chest pain, palpitations and leg swelling. Gastrointestinal: Negative for abdominal pain, diarrhea, nausea and vomiting. Genitourinary: Positive for menstrual problem. Negative for difficulty urinating, dyspareunia, vaginal discharge and vaginal pain. Neurological: Negative for dizziness, light-headedness and headaches.        Preventive Health Screening:   Date of last pap: 2017 normal               HPV typing/date: 2017  negative  History of Abnormal Paps: no      Preventive screening: No    Family history of Breast, Ovarian, Colon or Uterine Cancer:  no     If Yes see scanned worksheet    Objective  /74   Pulse 80   Ht 5' (1.524 m)   Wt 121 lb 14.4 oz (55.3 kg)   LMP 12/26/2020 (Exact Date)   BMI 23.81 kg/m²     Intimate Partner Screening HITS Tool <10 negative screen: negative  PHQ-2 (<3 negative): negative  ELLIE-7 (5 mild anxiety, 10 moderate anxiety, 15 severe anxiety): negative    Gynecologic History  Menarche: 11  Monthly menses (days): irregular   Length: 5  Flow: moderate    Gardasil Series: No      Sexually active: Yes  New Sex Partner within 3 months: No  Domestic Violence Screening: negative    STD history: No     Birth control method :No       Physical Exam  Constitutional:       Appearance: Normal appearance. Neck:      Musculoskeletal: Normal range of motion. Cardiovascular:      Rate and Rhythm: Normal rate and regular rhythm. Pulmonary:      Effort: Pulmonary effort is normal.      Breath sounds: Normal breath sounds. Neurological:      Mental Status: She is alert and oriented to person, place, and time. Psychiatric:         Mood and Affect: Mood normal.         Behavior: Behavior normal.         Thought Content: Thought content normal.         Judgment: Judgment normal.   Vitals signs reviewed. Assessment & Plan  1. Well woman exam  Age 25 and > Well Woman Care  General Health:  [x] Alcohol screening & counseling  [x] Blood pressure screening: normal  [x] Contraceptive counseling & methods: discussed options desires to try nuvaring  [x] Depression Screening: Negative  [x] Diabetes Screening: discussed, screening by PCP  [] Folic acid supplementation  [x] Healthful diet & activity counseling:  discussed  [x] Interpersonal violence screening: Negative  [x] Lipid screening: done on 10/2020  [x] Obesity Screening: BMI 23  [] Osteoporosis screening  [] Substance use screening & counseling  [x] Tobacco screening & counseling  [x] Urinary incontinence screening    Infectious Diseases:  [x] Gonorrhea & chlamydia screening: discussed, declined  [] Hepatitis C Screening   [] HIV risk assessment/ testing (at least once in lifetime):   [] Immunizations:   [] STI prevention counseling    Cancer:  [x] Cervical cancer screening: discussed, pap due 2022.  Normal pap 2017 with no hx of abnormals  [x] Mammograms (started at 38yrs old): discussed and patient is agreeable  [x] BRCA testing risk assessment: discussed/no risk factors  [] Colon cancer screening:  [] Skin Cancer Screenin. Dysmenorrhea  - Patient was educated to notify office and seek medical care if abdominal pain, chest pain, severe headaches, eye problems/loss of vision, or severe leg pain or swelling in the calf occurs (ACHES). - etonogestrel-ethinyl estradiol (NUVARING) 0.12-0.015 MG/24HR vaginal ring; Place 1 each vaginally every 21 days Insert one (1) ring vaginally and leave in place for three (3) weeks, then remove for one (1) week. Dispense: 3 each; Refill: 3      Return in about 1 year (around 2022) for Annual.     The patient, Jero Hill , was seen with a total time spent of 25 minutes for the visit on this date of service by the [facility]  The time component, involved both face-to-face (counseling and education)  and non face-to-face time (care coordination), spent in determining the total time component. She was also counseled on her preventative health maintenance recommendations and follow-up.     Electronically Signed by Kade Chan

## 2021-03-05 DIAGNOSIS — N94.6 DYSMENORRHEA: ICD-10-CM

## 2021-03-05 RX ORDER — ETONOGESTREL AND ETHINYL ESTRADIOL 11.7; 2.7 MG/1; MG/1
1 INSERT, EXTENDED RELEASE VAGINAL
Qty: 3 EACH | Refills: 3 | Status: SHIPPED | OUTPATIENT
Start: 2021-03-05 | End: 2022-01-05

## 2021-03-05 NOTE — TELEPHONE ENCOUNTER
Lawernce Sample is calling to request a refill on the following medication(s):    Last Visit Date (If Applicable):  43/0/0779    Next Visit Date:    Visit date not found    Medication Request:  Requested Prescriptions     Pending Prescriptions Disp Refills    etonogestrel-ethinyl estradiol (NUVARING) 0.12-0.015 MG/24HR vaginal ring 3 each 3     Sig: Place 1 each vaginally every 21 days Insert one (1) ring vaginally and leave in place for three (3) weeks, then remove for one (1) week.

## 2021-06-05 ENCOUNTER — APPOINTMENT (OUTPATIENT)
Dept: CT IMAGING | Age: 36
End: 2021-06-05
Payer: COMMERCIAL

## 2021-06-05 ENCOUNTER — HOSPITAL ENCOUNTER (EMERGENCY)
Age: 36
Discharge: HOME | End: 2021-06-06
Attending: EMERGENCY MEDICINE
Payer: COMMERCIAL

## 2021-06-05 VITALS
SYSTOLIC BLOOD PRESSURE: 103 MMHG | DIASTOLIC BLOOD PRESSURE: 86 MMHG | TEMPERATURE: 97.4 F | HEART RATE: 102 BPM | OXYGEN SATURATION: 99 % | RESPIRATION RATE: 16 BRPM

## 2021-06-05 DIAGNOSIS — R10.11 RIGHT UPPER QUADRANT ABDOMINAL PAIN: Primary | ICD-10-CM

## 2021-06-05 LAB
ABSOLUTE EOS #: 0.2 K/UL (ref 0–0.4)
ABSOLUTE IMMATURE GRANULOCYTE: ABNORMAL K/UL (ref 0–0.3)
ABSOLUTE LYMPH #: 1.8 K/UL (ref 1–4.8)
ABSOLUTE MONO #: 0.5 K/UL (ref 0.1–1.3)
ALBUMIN SERPL-MCNC: 4.5 G/DL (ref 3.5–5.2)
ALBUMIN/GLOBULIN RATIO: ABNORMAL (ref 1–2.5)
ALP BLD-CCNC: 38 U/L (ref 35–104)
ALT SERPL-CCNC: 10 U/L (ref 5–33)
ANION GAP SERPL CALCULATED.3IONS-SCNC: 13 MMOL/L (ref 9–17)
AST SERPL-CCNC: 17 U/L
BASOPHILS # BLD: 0 % (ref 0–2)
BASOPHILS ABSOLUTE: 0 K/UL (ref 0–0.2)
BILIRUB SERPL-MCNC: 1.16 MG/DL (ref 0.3–1.2)
BUN BLDV-MCNC: 8 MG/DL (ref 6–20)
BUN/CREAT BLD: ABNORMAL (ref 9–20)
CALCIUM SERPL-MCNC: 9.1 MG/DL (ref 8.6–10.4)
CHLORIDE BLD-SCNC: 100 MMOL/L (ref 98–107)
CO2: 24 MMOL/L (ref 20–31)
CREAT SERPL-MCNC: 0.66 MG/DL (ref 0.5–0.9)
DIFFERENTIAL TYPE: ABNORMAL
EOSINOPHILS RELATIVE PERCENT: 2 % (ref 0–4)
GFR AFRICAN AMERICAN: >60 ML/MIN
GFR NON-AFRICAN AMERICAN: >60 ML/MIN
GFR SERPL CREATININE-BSD FRML MDRD: ABNORMAL ML/MIN/{1.73_M2}
GFR SERPL CREATININE-BSD FRML MDRD: ABNORMAL ML/MIN/{1.73_M2}
GLUCOSE BLD-MCNC: 135 MG/DL (ref 70–99)
HCT VFR BLD CALC: 42 % (ref 36–46)
HEMOGLOBIN: 14.3 G/DL (ref 12–16)
IMMATURE GRANULOCYTES: ABNORMAL %
LIPASE: 29 U/L (ref 13–60)
LYMPHOCYTES # BLD: 21 % (ref 24–44)
MAGNESIUM: 1.9 MG/DL (ref 1.6–2.6)
MCH RBC QN AUTO: 29.8 PG (ref 26–34)
MCHC RBC AUTO-ENTMCNC: 33.9 G/DL (ref 31–37)
MCV RBC AUTO: 87.8 FL (ref 80–100)
MONOCYTES # BLD: 6 % (ref 1–7)
NRBC AUTOMATED: ABNORMAL PER 100 WBC
PDW BLD-RTO: 12.7 % (ref 11.5–14.9)
PLATELET # BLD: 250 K/UL (ref 150–450)
PLATELET ESTIMATE: ABNORMAL
PMV BLD AUTO: 7.7 FL (ref 6–12)
POTASSIUM SERPL-SCNC: 3.1 MMOL/L (ref 3.7–5.3)
RBC # BLD: 4.78 M/UL (ref 4–5.2)
RBC # BLD: ABNORMAL 10*6/UL
SEG NEUTROPHILS: 71 % (ref 36–66)
SEGMENTED NEUTROPHILS ABSOLUTE COUNT: 6.1 K/UL (ref 1.3–9.1)
SODIUM BLD-SCNC: 137 MMOL/L (ref 135–144)
TOTAL PROTEIN: 7.3 G/DL (ref 6.4–8.3)
WBC # BLD: 8.6 K/UL (ref 3.5–11)
WBC # BLD: ABNORMAL 10*3/UL

## 2021-06-05 PROCEDURE — 81025 URINE PREGNANCY TEST: CPT

## 2021-06-05 PROCEDURE — 96365 THER/PROPH/DIAG IV INF INIT: CPT

## 2021-06-05 PROCEDURE — 83690 ASSAY OF LIPASE: CPT

## 2021-06-05 PROCEDURE — 83735 ASSAY OF MAGNESIUM: CPT

## 2021-06-05 PROCEDURE — 2580000003 HC RX 258: Performed by: EMERGENCY MEDICINE

## 2021-06-05 PROCEDURE — 80053 COMPREHEN METABOLIC PANEL: CPT

## 2021-06-05 PROCEDURE — 74177 CT ABD & PELVIS W/CONTRAST: CPT

## 2021-06-05 PROCEDURE — 99283 EMERGENCY DEPT VISIT LOW MDM: CPT

## 2021-06-05 PROCEDURE — 36415 COLL VENOUS BLD VENIPUNCTURE: CPT

## 2021-06-05 PROCEDURE — 6360000002 HC RX W HCPCS: Performed by: EMERGENCY MEDICINE

## 2021-06-05 PROCEDURE — 85025 COMPLETE CBC W/AUTO DIFF WBC: CPT

## 2021-06-05 PROCEDURE — 6360000004 HC RX CONTRAST MEDICATION: Performed by: EMERGENCY MEDICINE

## 2021-06-05 PROCEDURE — 6370000000 HC RX 637 (ALT 250 FOR IP): Performed by: EMERGENCY MEDICINE

## 2021-06-05 RX ORDER — POTASSIUM CHLORIDE 20 MEQ/1
40 TABLET, EXTENDED RELEASE ORAL ONCE
Status: COMPLETED | OUTPATIENT
Start: 2021-06-05 | End: 2021-06-05

## 2021-06-05 RX ORDER — 0.9 % SODIUM CHLORIDE 0.9 %
1000 INTRAVENOUS SOLUTION INTRAVENOUS ONCE
Status: COMPLETED | OUTPATIENT
Start: 2021-06-05 | End: 2021-06-05

## 2021-06-05 RX ORDER — 0.9 % SODIUM CHLORIDE 0.9 %
80 INTRAVENOUS SOLUTION INTRAVENOUS ONCE
Status: COMPLETED | OUTPATIENT
Start: 2021-06-05 | End: 2021-06-05

## 2021-06-05 RX ORDER — SODIUM CHLORIDE 0.9 % (FLUSH) 0.9 %
10 SYRINGE (ML) INJECTION PRN
Status: DISCONTINUED | OUTPATIENT
Start: 2021-06-05 | End: 2021-06-06 | Stop reason: HOSPADM

## 2021-06-05 RX ORDER — POTASSIUM CHLORIDE 7.45 MG/ML
10 INJECTION INTRAVENOUS ONCE
Status: COMPLETED | OUTPATIENT
Start: 2021-06-05 | End: 2021-06-06

## 2021-06-05 RX ADMIN — POTASSIUM CHLORIDE 40 MEQ: 1500 TABLET, EXTENDED RELEASE ORAL at 23:27

## 2021-06-05 RX ADMIN — IOPAMIDOL 75 ML: 755 INJECTION, SOLUTION INTRAVENOUS at 22:48

## 2021-06-05 RX ADMIN — POTASSIUM CHLORIDE 10 MEQ: 7.46 INJECTION, SOLUTION INTRAVENOUS at 23:27

## 2021-06-05 RX ADMIN — SODIUM CHLORIDE 80 ML: 9 INJECTION, SOLUTION INTRAVENOUS at 22:48

## 2021-06-05 RX ADMIN — SODIUM CHLORIDE 1000 ML: 9 INJECTION, SOLUTION INTRAVENOUS at 22:01

## 2021-06-05 RX ADMIN — SODIUM CHLORIDE, PRESERVATIVE FREE 10 ML: 5 INJECTION INTRAVENOUS at 22:48

## 2021-06-05 ASSESSMENT — ENCOUNTER SYMPTOMS
DIARRHEA: 1
BACK PAIN: 0
EYE PAIN: 0
ABDOMINAL PAIN: 1
COLOR CHANGE: 0
SHORTNESS OF BREATH: 0
NAUSEA: 1
VOMITING: 1

## 2021-06-05 ASSESSMENT — PAIN SCALES - GENERAL: PAINLEVEL_OUTOF10: 4

## 2021-06-05 NOTE — ED TRIAGE NOTES
Mode of arrival (squad #, walk in, police, etc) : walk in        Chief complaint(s): NVD, abd pain        Arrival Note (brief scenario, treatment PTA, etc). : Pt states she has been having NVD today. Pt has intermittent RUQ pain and then an episode of diarrhea follows. Pt reports sick contacts, but states they are already feeling better. C= \"Have you ever felt that you should Cut down on your drinking? \"  No  A= \"Have people Annoyed you by criticizing your drinking? \"  No  G= \"Have you ever felt bad or Guilty about your drinking? \"  No  E= \"Have you ever had a drink as an Eye-opener first thing in the morning to steady your nerves or to help a hangover? \"  No      Deferred []      Reason for deferring: N/A    *If yes to two or more: probable alcohol abuse. *

## 2021-06-06 LAB
-: NORMAL
AMORPHOUS: NORMAL
BACTERIA: NORMAL
BILIRUBIN URINE: NEGATIVE
CASTS UA: NORMAL /LPF
COLOR: YELLOW
COMMENT UA: ABNORMAL
CRYSTALS, UA: NORMAL /HPF
EPITHELIAL CELLS UA: NORMAL /HPF
GLUCOSE URINE: NEGATIVE
HCG(URINE) PREGNANCY TEST: NEGATIVE
KETONES, URINE: NEGATIVE
LEUKOCYTE ESTERASE, URINE: ABNORMAL
MUCUS: NORMAL
NITRITE, URINE: NEGATIVE
OTHER OBSERVATIONS UA: NORMAL
PH UA: 5 (ref 5–8)
PROTEIN UA: NEGATIVE
RBC UA: NORMAL /HPF
RENAL EPITHELIAL, UA: NORMAL /HPF
SPECIFIC GRAVITY UA: 1.08 (ref 1–1.03)
TRICHOMONAS: NORMAL
TURBIDITY: CLEAR
URINE HGB: NEGATIVE
UROBILINOGEN, URINE: NORMAL
WBC UA: NORMAL /HPF
YEAST: NORMAL

## 2021-06-06 PROCEDURE — 81001 URINALYSIS AUTO W/SCOPE: CPT

## 2021-06-06 RX ORDER — ONDANSETRON 4 MG/1
4 TABLET, ORALLY DISINTEGRATING ORAL EVERY 8 HOURS PRN
Qty: 20 TABLET | Refills: 0 | Status: SHIPPED | OUTPATIENT
Start: 2021-06-06 | End: 2021-12-02

## 2021-06-06 NOTE — ED PROVIDER NOTES
EMERGENCY DEPARTMENT ENCOUNTER    Pt Name: Latha Parrish  MRN: [id number]  [place] 1985  Date of evaluation: 6/5/21  CHIEF COMPLAINT       Chief Complaint   Patient presents with    Abdominal Pain    Emesis    Diarrhea     HISTORY OF PRESENT ILLNESS   42-year-old female presents for complaint of right upper quadrant abdominal pain. Patient reports pain started earlier this morning. Patient reports that pain is sharp and achy in nature, states it comes and goes, patient reports that it was worse after she ate dinner. Patient admits associated nausea did have some emesis earlier in the day as well as a few episodes of diarrhea. Patient denies any fevers at home, denies any associated chest pain or shortness of breath. Patient states he has had this in the past but has never been seen for it. The history is provided by the patient. REVIEW OF SYSTEMS     Review of Systems   Constitutional: Negative for fever. HENT: Negative for congestion and ear pain. Eyes: Negative for pain. Respiratory: Negative for shortness of breath. Cardiovascular: Negative for chest pain, palpitations and leg swelling. Gastrointestinal: Positive for abdominal pain, diarrhea, nausea and vomiting. Genitourinary: Negative for dysuria and flank pain. Musculoskeletal: Negative for back pain. Skin: Negative for color change. Neurological: Negative for numbness and headaches. Psychiatric/Behavioral: Negative for confusion. All other systems reviewed and are negative.     PASTMEDICAL HISTORY     Past Medical History:   Diagnosis Date    Hip fracture ([address] 01/2017    Irregular menses     Stress fracture of hip      Past Problem List  Patient Active Problem List   Diagnosis Code    Hemorrhoids K64.9    Thyroid nodule E04.1    6 weeks postpartum follow-up Z39.2     SURGICAL HISTORY       Past Surgical History:   Procedure Laterality Date    WISDOM TOOTH EXTRACTION      WISDOM TOOTH EXTRACTION CURRENT MEDICATIONS       Discharge Medication List as of 6/6/2021 12:57 AM      CONTINUE these medications which have NOT CHANGED    Details   etonogestrel-ethinyl estradiol (NUVARING) 0.12-0.015 MG/24HR vaginal ring Place 1 each vaginally every 21 days Insert one (1) ring vaginally and leave in place for three (3) weeks, then remove for one (1) week., Disp-3 each, R-3Normal      Calcium-Magnesium-Vitamin D (CALCIUM 500 PO) Take by mouthHistorical Med      Cholecalciferol (VITAMIN D PO) Take by mouthHistorical Med           ALLERGIES     has No Known Allergies. FAMILY HISTORY     She indicated that the status of her mother is unknown. She indicated that the status of her father is unknown. She indicated that the status of her maternal grandmother is unknown. She indicated that the status of her paternal grandmother is unknown. She indicated that the status of her paternal grandfather is unknown. She indicated that the status of her maternal uncle is unknown. SOCIAL HISTORY       Social History     Tobacco Use    Smoking status: Never Smoker    Smokeless tobacco: Never Used   Vaping Use    Vaping Use: Never used   Substance Use Topics    Alcohol use: Yes     Alcohol/week: 0.0 standard drinks     Comment: social    Drug use: No     PHYSICAL EXAM     INITIAL VITALS: /86   Pulse 102   Temp 97.4 °F (36.3 °C) (Temporal)   Resp 16   SpO2 99%    Physical Exam  Vitals and nursing note reviewed. Constitutional:       General: She is not in acute distress. Appearance: Normal appearance. She is not toxic-appearing. HENT:      Head: Normocephalic and atraumatic. Nose: Nose normal.      Mouth/Throat:      Mouth: Mucous membranes are moist.      Pharynx: Oropharynx is clear. Eyes:      Extraocular Movements: Extraocular movements intact. Conjunctiva/sclera: Conjunctivae normal.   Cardiovascular:      Rate and Rhythm: Regular rhythm. Tachycardia present. Pulses: Normal pulses. Heart sounds: Normal heart sounds. Pulmonary:      Effort: Pulmonary effort is normal.      Breath sounds: Normal breath sounds. Abdominal:      General: Bowel sounds are normal. There is no distension. Palpations: Abdomen is soft. Tenderness: There is abdominal tenderness in the right upper quadrant. Musculoskeletal:         General: Normal range of motion. Cervical back: Normal range of motion. Skin:     General: Skin is warm and dry. Capillary Refill: Capillary refill takes less than 2 seconds. Neurological:      General: No focal deficit present. Mental Status: She is alert. Psychiatric:         Mood and Affect: Mood normal.         MEDICAL DECISION MAKIN-year-old female presents for right upper quadrant abdominal pain, nausea, vomiting, and diarrhea. On initial exam patient in no acute distress, heart rate is noted be 102, did have tenderness in the right upper quadrant on exam, will check labs, will check CT    Labs are reviewed patient was noted to have a potassium of 3.1, will replace, CT was negative for acute process    Patient reexamined, reports pain is still controlled, discussed results with the patient, discussed concern for biliary colic causing patient symptoms, discussed need for follow-up with PCP and general surgery, discussed strict return precautions, patient voiced understanding is comfortable with discharge home    Patient/Guardian was informed of their diagnosis and told to follow up with PCP & general surgery in 1-3 days. Patient demonstrates understanding and agreement with the plan. They were given the opportunity to ask questions and those questions were answered to the best of our ability with the available information. Patient/Guardian told to return to the ED for any new, worsening, changing or persistent symptoms. This dictation was prepared using Particle voice recognition software.          CRITICAL CARE: PROCEDURES:    Procedures    DIAGNOSTIC RESULTS   EKG:All EKG's are interpreted by the Emergency Department Physician who either signs or Co-signs this chart in the absence of a cardiologist.        RADIOLOGY:All plain film, CT, MRI, and formal ultrasound images (except ED bedside ultrasound) are read by the radiologist, see reports below, unless otherwisenoted in MDM or here. CT ABDOMEN PELVIS W IV CONTRAST Additional Contrast? None   Final Result   No acute abdominal or pelvic abnormality. Free fluid the pelvis that is nonspecific and may be physiologic. LABS: All lab results were reviewed by myself, and all abnormals are listed below.   Labs Reviewed   CBC WITH AUTO DIFFERENTIAL - Abnormal; Notable for the following components:       Result Value    Seg Neutrophils 71 (*)     Lymphocytes 21 (*)     All other components within normal limits   COMPREHENSIVE METABOLIC PANEL W/ REFLEX TO MG FOR LOW K - Abnormal; Notable for the following components:    Glucose 135 (*)     Potassium 3.1 (*)     All other components within normal limits   URINALYSIS - Abnormal; Notable for the following components:    Specific Gravity, UA 1.084 (*)     Leukocyte Esterase, Urine SMALL (*)     All other components within normal limits   LIPASE   PREGNANCY, URINE   MAGNESIUM   MICROSCOPIC URINALYSIS       EMERGENCY DEPARTMENTCOURSE:         Vitals:    Vitals:    06/05/21 1936   BP: 103/86   Pulse: 102   Resp: 16   Temp: 97.4 °F (36.3 °C)   TempSrc: Temporal   SpO2: 99%       The patient was given the following medications while in the emergency department:  Orders Placed This Encounter   Medications    0.9 % sodium chloride bolus    0.9 % sodium chloride bolus    iopamidol (ISOVUE-370) 76 % injection 75 mL    DISCONTD: sodium chloride flush 0.9 % injection 10 mL    potassium chloride (KLOR-CON M) extended release tablet 40 mEq    potassium chloride 10 mEq/100 mL IVPB (Peripheral Line)    ondansetron (ZOFRAN ODT) 4 MG disintegrating tablet     Sig: Take 1 tablet by mouth every 8 hours as needed for Nausea or Vomiting     Dispense:  20 tablet     Refill:  0     CONSULTS:  None    FINAL IMPRESSION      1.  Right upper quadrant abdominal pain          DISPOSITION/PLAN   DISPOSITION Decision To Discharge 06/06/2021 12:56:49 AM      PATIENT REFERRED TO:  SHAGGY Stratton CNP  [address] [address]  [phone]    Schedule an appointment as soon as possible for a visit       [facility] ED  [address]  [address] [place] [address]  [phone]    As needed, If symptoms worsen    Patricia Mcgee MD  [address]  [phone]    Schedule an appointment as soon as possible for a visit       DISCHARGE MEDICATIONS:  Discharge Medication List as of 6/6/2021 12:57 AM      START taking these medications    Details   ondansetron (ZOFRAN ODT) 4 MG disintegrating tablet Take 1 tablet by mouth every 8 hours as needed for Nausea or Vomiting, Disp-20 tablet, R-0Print           Jannet Quinteros DO  Attending Emergency Physician                  Jannet Quinteros DO  06/06/21 0448

## 2021-10-20 ENCOUNTER — HOSPITAL ENCOUNTER (OUTPATIENT)
Facility: CLINIC | Age: 36
Discharge: HOME | End: 2021-10-22
Payer: COMMERCIAL

## 2021-10-20 ENCOUNTER — HOSPITAL ENCOUNTER (OUTPATIENT)
Dept: GENERAL RADIOLOGY | Facility: CLINIC | Age: 36
Discharge: HOME | End: 2021-10-22
Payer: COMMERCIAL

## 2021-10-20 DIAGNOSIS — Z87.312 HISTORY OF STRESS FRACTURE: ICD-10-CM

## 2021-10-20 DIAGNOSIS — M79.661 PAIN IN RIGHT SHIN: ICD-10-CM

## 2021-10-20 PROCEDURE — 73590 X-RAY EXAM OF LOWER LEG: CPT

## 2021-12-02 ENCOUNTER — HOSPITAL ENCOUNTER (OUTPATIENT)
Age: 36
Setting detail: SPECIMEN
Discharge: HOME | End: 2021-12-02

## 2021-12-02 DIAGNOSIS — Z13.9 SCREENING FOR CONDITION: ICD-10-CM

## 2021-12-02 LAB
ABSOLUTE EOS #: 0.3 K/UL (ref 0–0.44)
ABSOLUTE IMMATURE GRANULOCYTE: <0.03 K/UL (ref 0–0.3)
ABSOLUTE LYMPH #: 1.67 K/UL (ref 1.1–3.7)
ABSOLUTE MONO #: 0.49 K/UL (ref 0.1–1.2)
ALBUMIN SERPL-MCNC: 4.3 G/DL (ref 3.5–5.2)
ALBUMIN/GLOBULIN RATIO: 1.7 (ref 1–2.5)
ALP BLD-CCNC: 32 U/L (ref 35–104)
ALT SERPL-CCNC: 11 U/L (ref 5–33)
ANION GAP SERPL CALCULATED.3IONS-SCNC: 14 MMOL/L (ref 9–17)
AST SERPL-CCNC: 16 U/L
BASOPHILS # BLD: 1 % (ref 0–2)
BASOPHILS ABSOLUTE: 0.04 K/UL (ref 0–0.2)
BILIRUB SERPL-MCNC: 1.01 MG/DL (ref 0.3–1.2)
BUN BLDV-MCNC: 10 MG/DL (ref 6–20)
BUN/CREAT BLD: ABNORMAL (ref 9–20)
CALCIUM SERPL-MCNC: 8.9 MG/DL (ref 8.6–10.4)
CHLORIDE BLD-SCNC: 102 MMOL/L (ref 98–107)
CHOLESTEROL, FASTING: 173 MG/DL
CHOLESTEROL/HDL RATIO: 2.7
CO2: 22 MMOL/L (ref 20–31)
CREAT SERPL-MCNC: 0.69 MG/DL (ref 0.5–0.9)
DIFFERENTIAL TYPE: ABNORMAL
EOSINOPHILS RELATIVE PERCENT: 5 % (ref 1–4)
GFR AFRICAN AMERICAN: >60 ML/MIN
GFR NON-AFRICAN AMERICAN: >60 ML/MIN
GFR SERPL CREATININE-BSD FRML MDRD: ABNORMAL ML/MIN/{1.73_M2}
GFR SERPL CREATININE-BSD FRML MDRD: ABNORMAL ML/MIN/{1.73_M2}
GLUCOSE BLD-MCNC: 93 MG/DL (ref 70–99)
HCT VFR BLD CALC: 41.5 % (ref 36.3–47.1)
HDLC SERPL-MCNC: 63 MG/DL
HEMOGLOBIN: 13.8 G/DL (ref 11.9–15.1)
IMMATURE GRANULOCYTES: 0 %
LDL CHOLESTEROL: 93 MG/DL (ref 0–130)
LYMPHOCYTES # BLD: 30 % (ref 24–43)
MCH RBC QN AUTO: 29.7 PG (ref 25.2–33.5)
MCHC RBC AUTO-ENTMCNC: 33.3 G/DL (ref 28.4–34.8)
MCV RBC AUTO: 89.4 FL (ref 82.6–102.9)
MONOCYTES # BLD: 9 % (ref 3–12)
NRBC AUTOMATED: 0 PER 100 WBC
PDW BLD-RTO: 11.6 % (ref 11.8–14.4)
PLATELET # BLD: 248 K/UL (ref 138–453)
PLATELET ESTIMATE: ABNORMAL
PMV BLD AUTO: 10.3 FL (ref 8.1–13.5)
POTASSIUM SERPL-SCNC: 4.1 MMOL/L (ref 3.7–5.3)
RBC # BLD: 4.64 M/UL (ref 3.95–5.11)
RBC # BLD: ABNORMAL 10*6/UL
SEG NEUTROPHILS: 55 % (ref 36–65)
SEGMENTED NEUTROPHILS ABSOLUTE COUNT: 3.01 K/UL (ref 1.5–8.1)
SODIUM BLD-SCNC: 138 MMOL/L (ref 135–144)
TOTAL PROTEIN: 6.9 G/DL (ref 6.4–8.3)
TRIGLYCERIDE, FASTING: 84 MG/DL
VLDLC SERPL CALC-MCNC: NORMAL MG/DL (ref 1–30)
WBC # BLD: 5.5 K/UL (ref 3.5–11.3)
WBC # BLD: ABNORMAL 10*3/UL

## 2021-12-13 ENCOUNTER — HOSPITAL ENCOUNTER (OUTPATIENT)
Dept: ULTRASOUND IMAGING | Age: 36
Discharge: HOME | End: 2021-12-15
Payer: COMMERCIAL

## 2021-12-13 DIAGNOSIS — E04.1 THYROID NODULE: ICD-10-CM

## 2021-12-13 PROCEDURE — 76536 US EXAM OF HEAD AND NECK: CPT

## 2021-12-16 ENCOUNTER — TELEPHONE (OUTPATIENT)
Dept: INTERVENTIONAL RADIOLOGY/VASCULAR | Age: 36
End: 2021-12-16

## 2021-12-17 ENCOUNTER — HOSPITAL ENCOUNTER (OUTPATIENT)
Age: 36
Setting detail: SPECIMEN
Discharge: HOME | End: 2021-12-17

## 2021-12-17 DIAGNOSIS — R93.89 THYROID WITH HETEROGENEOUS ECHOTEXTURE DETERMINED BY ULTRASOUND: ICD-10-CM

## 2021-12-17 DIAGNOSIS — E04.1 THYROID NODULE: ICD-10-CM

## 2021-12-17 LAB
THYROXINE, FREE: 1.3 NG/DL (ref 0.93–1.7)
TSH SERPL DL<=0.05 MIU/L-ACNC: 1.85 MIU/L (ref 0.3–5)

## 2021-12-20 LAB
THYROGLOBULIN AB: 19 IU/ML (ref 0–40)
THYROID PEROXIDASE (TPO) AB: 7.6 IU/ML (ref 0–25)

## 2022-01-10 ENCOUNTER — HOSPITAL ENCOUNTER (OUTPATIENT)
Dept: INTERVENTIONAL RADIOLOGY/VASCULAR | Age: 37
Discharge: HOME | End: 2022-01-12
Payer: COMMERCIAL

## 2022-01-10 DIAGNOSIS — E04.1 THYROID NODULE: ICD-10-CM

## 2022-01-10 PROCEDURE — 2709999900 IR BIOPSY THYROID PERC CORE NEEDLE

## 2022-01-10 PROCEDURE — 10005 FNA BX W/US GDN 1ST LES: CPT

## 2022-01-10 PROCEDURE — 88172 CYTP DX EVAL FNA 1ST EA SITE: CPT

## 2022-01-10 PROCEDURE — 88305 TISSUE EXAM BY PATHOLOGIST: CPT

## 2022-01-10 PROCEDURE — 88173 CYTOPATH EVAL FNA REPORT: CPT

## 2022-01-10 PROCEDURE — 10006 FNA BX W/US GDN EA ADDL: CPT

## 2022-01-10 NOTE — BRIEF OP NOTE
Brief Postoperative Note    Mahesh Quan  YOB: 1985  [id number]    Pre-operative Diagnosis: Left thyroid nodule    Post-operative Diagnosis: Same    Procedure: US guided FNA    Anesthesia: Local    Surgeons/Assistants: Julito    Estimated Blood Loss: less than 50     Complications: None    Specimens: Was Obtained: 3 passes using 25 G needle    Electronically signed by Jose Phillip MD on 1/10/2022 at 2:14 PM

## 2022-01-10 NOTE — PROGRESS NOTES
Supine on stretcher, alert and oriented. Neck prepped on LT. Us in use. Numbed and accessed by Dr Coty Weeks. 3 FNAs Specimens given to pathologist and team who is present. Access removed band aid applied. Tolerated well. Verbalizes understanding of discharge instructions.

## 2022-01-11 LAB — SURGICAL PATHOLOGY REPORT: NORMAL

## 2022-01-20 ENCOUNTER — OFFICE VISIT (OUTPATIENT)
Dept: ORTHOPEDIC SURGERY | Age: 37
End: 2022-01-20
Payer: COMMERCIAL

## 2022-01-20 VITALS — DIASTOLIC BLOOD PRESSURE: 86 MMHG | SYSTOLIC BLOOD PRESSURE: 132 MMHG

## 2022-01-20 DIAGNOSIS — S86.891A MEDIAL TIBIAL STRESS SYNDROME, RIGHT, INITIAL ENCOUNTER: Primary | ICD-10-CM

## 2022-01-20 PROCEDURE — 99203 OFFICE O/P NEW LOW 30 MIN: CPT | Performed by: FAMILY MEDICINE

## 2022-01-20 NOTE — PROGRESS NOTES
Sports Medicine Consultation     CHIEF COMPLAINT:  Leg Pain (Rt shin. 3+m. medial shin. took periodic breaks from running. )      HPI:  Lottie Dorsey is a 39y.o. year old female who is a new patient being seen for regarding new problem right shin pain. The pain has been present for 3 month(s). The patient recalls a running injury. The patient has tried rest, ice without improvement. The pain is described as sharp diffuse anterior pain that went to medial distal third of tibia aching. There is  pain on weightbearing. There is  pain with running/jumping. There is not associated numbness and tingling down into the foot. There is not a sensation of tightness increasing with exercise of the shin or calf. There is not associated achilles pain. she has a past medical history of Hip fracture ([address] Irregular menses, and Stress fracture of hip.    she has a past surgical history that includes [place] tooth extraction; [place] tooth extraction; and IR BIOPSY THYROID PERC CORE NEEDLE (1/10/2022). family history includes Depression in her maternal grandmother; Diabetes in her paternal grandmother; Emphysema in her mother; Heart Attack in her father; Heart Disease in her paternal grandfather; Other in her maternal uncle. Social History     Socioeconomic History    Marital status:      Spouse name: Tasha Barnes Number of children: Not on file    Years of education: Not on file    Highest education level: Not on file   Occupational History    Occupation:     Tobacco Use    Smoking status: Never Smoker    Smokeless tobacco: Never Used   Vaping Use    Vaping Use: Never used   Substance and Sexual Activity    Alcohol use:  Yes     Alcohol/week: 0.0 standard drinks     Comment: social    Drug use: No    Sexual activity: Yes     Partners: Male   Other Topics Concern    Not on file   Social History Narrative    Not on file     Social Determinants of Health     Financial Resource Strain:     Difficulty of Paying Living Expenses: Not on file   Food Insecurity:     Worried About Running Out of Food in the Last Year: Not on file    Katerine of Food in the Last Year: Not on file   Transportation Needs:     Lack of Transportation (Medical): Not on file    Lack of Transportation (Non-Medical): Not on file   Physical Activity:     Days of Exercise per Week: Not on file    Minutes of Exercise per Session: Not on file   Stress:     Feeling of Stress : Not on file   Social Connections:     Frequency of Communication with Friends and Family: Not on file    Frequency of Social Gatherings with Friends and Family: Not on file    Attends Rastafarian Services: Not on file    Active Member of [address] [place] [address] [employer] or Organizations: Not on file    Attends Club or Organization Meetings: Not on file    Marital Status: Not on file   Intimate Partner Violence:     Fear of Current or Ex-Partner: Not on file    Emotionally Abused: Not on file    Physically Abused: Not on file    Sexually Abused: Not on file   Housing Stability:     Unable to Pay for Housing in the Last Year: Not on file    Number of [place] in the Last Year: Not on file    Unstable Housing in the Last Year: Not on file       Current Outpatient Medications   Medication Sig Dispense Refill    etonogestrel-ethinyl estradiol (NUVARING) 0.12-0.015 MG/24HR vaginal ring INSERT ONE RING VAGINALLY AND LEAVE IN PLACE FOR 3 WEEKS, THEN REMOVE FOR ONE WEEK 3 each 3     No current facility-administered medications for this visit. Allergies:  shehas No Known Allergies. ROS:  CV:  Denies chest pain; palpitations; shortness of breath; swelling of feet, ankles; and loss of consciousness. CON: Denies fever and dizziness. ENT:  Denies hearing loss / ringing, ear infections hoarseness, and swallowing problems. RESP:  Denies chronic cough, spitting up blood, and asthma/wheezing.   GI: Denies abdominal pain, change in bowel habits, nausea or vomiting, over stress fracture we can treat her conservatively with booting patient was provided a tall walking boot in the office and we will reevaluate her in 6 weeks. She may weight-bear as tolerated in the boot but she is not to drive in the boot. Return in about 6 weeks (around 3/3/2022).     Please be aware portions of this note were completed using voice recognition software and unforeseen errors may have occurred    Electronically signed by Princess Dinh DO, FAOASM  on 1/20/22 at 3:35 PM EST

## 2022-03-01 ENCOUNTER — OFFICE VISIT (OUTPATIENT)
Dept: ORTHOPEDIC SURGERY | Age: 37
End: 2022-03-01
Payer: COMMERCIAL

## 2022-03-01 DIAGNOSIS — S86.891A MEDIAL TIBIAL STRESS SYNDROME, RIGHT, INITIAL ENCOUNTER: Primary | ICD-10-CM

## 2022-03-01 PROCEDURE — 99213 OFFICE O/P EST LOW 20 MIN: CPT | Performed by: FAMILY MEDICINE

## 2022-03-01 NOTE — PROGRESS NOTES
Living Expenses: Not on file   Food Insecurity:     Worried About [address] [employer] in the Last Year: Not on file    Katerine of Food in the Last Year: Not on file   Transportation Needs:     Lack of Transportation (Medical): Not on file    Lack of Transportation (Non-Medical): Not on file   Physical Activity:     Days of Exercise per Week: Not on file    Minutes of Exercise per Session: Not on file   Stress:     Feeling of Stress : Not on file   Social Connections:     Frequency of Communication with Friends and Family: Not on file    Frequency of Social Gatherings with Friends and Family: Not on file    Attends Jain Services: Not on file    Active Member of [address] [place] [address] or Organizations: Not on file    Attends Club or Organization Meetings: Not on file    Marital Status: Not on file   Intimate Partner Violence:     Fear of Current or Ex-Partner: Not on file    Emotionally Abused: Not on file    Physically Abused: Not on file    Sexually Abused: Not on file   Housing Stability:     Unable to Pay for Housing in the Last Year: Not on file    Number of [place] in the Last Year: Not on file    Unstable Housing in the Last Year: Not on file       Current Outpatient Medications   Medication Sig Dispense Refill    etonogestrel-ethinyl estradiol (NUVARING) 0.12-0.015 MG/24HR vaginal ring INSERT ONE RING VAGINALLY AND LEAVE IN PLACE FOR 3 WEEKS, THEN REMOVE FOR ONE WEEK 3 each 3     No current facility-administered medications for this visit. Allergies:  shehas No Known Allergies. ROS:  CV:  Denies chest pain; palpitations; shortness of breath; swelling of feet, ankles; and loss of consciousness. CON: Denies fever and dizziness. ENT:  Denies hearing loss / ringing, ear infections hoarseness, and swallowing problems. RESP:  Denies chronic cough, spitting up blood, and asthma/wheezing. GI: Denies abdominal pain, change in bowel habits, nausea or vomiting, and blood in stools.   :  Denies frequent urination, burning or painful urination, blood in the urine, and bladder incontinence. NEURO:  Denies headache, memory loss, sleep disturbance, and tremor or movement disorder. PHYSICAL EXAM:   There were no vitals taken for this visit. GENERAL: Monae Cain is a 39 y.o. female who is alert and oriented and sitting comfortably in our office. SKIN:  Intact without rashes, lesions or ulcerations. NEURO: Sensation to the extremity is intact. VASC:  Capillary refill is less than 3 seconds. There is no lymphadenopathy. Knee Exam  Musculoskeletal/Neurologic:  Inspection-Swelling: none,   Palpation-Tenderness:no knee ttp  ROM- 0-135  Strength- WNL    Tibia/Fibula exam  Tenderness no pain  The patient is  able to single toe raise. Ankle Exam:    Reveals there is not effusion. Swelling is not present. Edema is not not present. Ecchymoses is not present. Palpation-Tenderness no ankle  The foot is in normal alignment. ROM:  40 degrees plantarflexion and 20 degrees dorsiflexion. Subtalar motion is 30 degrees inversion and 20 degrees eversion. Strength-WNL    Gait: Normal    PSYCH:  Patient has good fund of knowledge and displays understanding of exam.    RADIOLOGY: No results found. IMPRESSION:     1. Medial tibial stress syndrome, right, initial encounter        PLAN:   We discussed some of the etiologies and natural histories of     ICD-10-CM    1. Medial tibial stress syndrome, right, initial encounter  S86.891A Ambulatory referral to Physical Therapy    We discussed the various treatment alternatives including anti-inflammatory medications, physical therapy, injections, further imaging studies and as a last resort surgery. At this point patient is best served with a course of formal physical therapy with one of our running therapist we will set her up for that course of therapy and she will follow-up with me as needed patient will discontinue use of the boot.   She voiced understanding agreement satisfaction with this plan. No follow-ups on file.     Please be aware portions of this note were completed using voice recognition software and unforeseen errors may have occurred    Electronically signed by Berhane Shepherd DO, FAOASM  on 3/1/22 at 4:06 PM EST

## 2022-03-03 ENCOUNTER — HOSPITAL ENCOUNTER (OUTPATIENT)
Dept: PHYSICAL THERAPY | Age: 37
Setting detail: THERAPIES SERIES
Discharge: HOME | End: 2022-03-03
Payer: COMMERCIAL

## 2022-03-03 PROCEDURE — 97161 PT EVAL LOW COMPLEX 20 MIN: CPT

## 2022-03-03 PROCEDURE — 97140 MANUAL THERAPY 1/> REGIONS: CPT

## 2022-03-03 PROCEDURE — 97110 THERAPEUTIC EXERCISES: CPT

## 2022-03-03 NOTE — CONSULTS
[] [address]  [facility] [address]   [address]  P: [phone]  F: [phone] [x] [address] [facility]  [address] [facility]   [address]  P: [phone]  F: [phone]       Physical Therapy Lower Extremity Evaluation    Date:  3/3/2022  Patient: Lindsay Nash  : 1985  MRN: [id number]  Physician: Dr. Rusty Case DO     Insurance: [employer] (60 Vs combined SLP/OT, HARD MAX)  Medical Diagnosis: Z32.442P - Medial tibial stress syndrome, right    Rehab Codes: M79.671, M79.604, M62.81, R26.2  Onset date: 2021  Next 's appt.:     Subjective:   CC: R leg/foot pain  HPI: (onset date)  Pt reports that in 2021 she thought she had shin splints and rested until 2022. Pt reports that she began a beginner's running program and started to have bone related pain and went to see Dr. Josie Easley. Pt reports that she was diagnosed with a R tibial stress reaction and was placed and in CAM walking boot for 6 weeks. Pt arrives in comfortable walking shoes and reports that she was able to walk on the treadmill some yesterday. Pt arrives without complaints of R tibia pain and reports that pain is in her arch. Pt reports history of bone loss in her R second digit. Pt reports that in 2021 prior to having increased pain she had increased her mileage and changed shoes. Pt reports that she was doing a mile a few times a week and then 4 miles for longer runs.      PMHx: [x] Other: hx of irregular menses, stress fracture of hip and R tibia              [x] Refer to full medical chart  In EPIC       Comorbidities:   [] Obesity [] Dialysis  [x] N/A   [] Asthma/COPD [] Dementia [] Other:   [] Stroke [] Sleep apnea [] Other:   [] Vascular disease [] Rheumatic disease [] Other:     Tests: [x] X-Ray: [] MRI:  [] Other:    Medications: [x] Refer to full medical record  Allergies:      [x] Refer to full medical record     Function:  Hand Dominance  [x] Right  [] Left  [address]   Job status     Work Activities/duties     Recreational Activities Running, power lifting class at the [facility] Previous level of function Current level of function Who currently assists the patient with task   All ADL's [x] Independent  [] Assist [x] Independent  [] Assist      Gait Prior level of function Current level of function    [x] Independent  [] Assist [x] Independent  [] Assist   Device: [x] Independent [x] Independent       Pain present?  None at rest   Location R foot   Pain Rating currently 0/10   Pain altered treatment N/A   Pain at worse 7/10 R foot, no tibial pain since being out of boot   Pain at best 0/10   Description of pain    Altered Sensation N/A   What makes it worse Walking   What makes it better Massage   Symptom progression Improving   Sleep Not disturbed           Objective:    ROM  ° A/P STRENGTH    Left Right Left Right   Hip Flex   5/5 5/5   Ext   5/5 5/5   ER       IR       ABD   5/5 5/5   ADD       Knee Flex   5/5 5/5   Ext   5/5 5/5   Ankle DF (knee straight) 22 3 5/5 5/5   DF (knee bent) 22 8     PF 40 30 5/5 4+/5   INV 52 50 5/5 5/5   EVER 20 14 5/5 5/5   GTE 20 8 4/5 4-/5     Flexibility Normal Left tight Right tight   Hip flexor [x] [] []   quad [x] [] []   HS [x] [] []   piriformis [x] [] []   ITB [] [] []   gastroc [x] [] []   Soleus  [x] [] []    [] [] []    [] [] []        OBSERVATION No Deficit Deficit Not Tested Comments   Posture       Forward Head [] [] []    Rounded Shoulders [] [] []    Kyphosis [] [] []    Lordosis [] [] []    Lateral Shift [] [] []    Scoliosis [] [] []    Iliac Crest [] [] []    PSIS [] [] []    ASIS [] [] []    Genu Valgus [x] [] []    Genu Varus [x] [] []    Genu Recurvatum [] [] []    Pronation [x] [] [] Neutral foot positioning bilaterally   Supination [x] [] []    Leg Length Discrp [] [] []    Slumped Sitting [x] [] []    Palpation [] [x] [] No tenderness to palpation along R tibia  Increased fascial restriction and tenderness to palpation of R plantar fascia and R flexor hallucis longus   Sensation [x] [] []    Edema [x] [] []    Neurological [x] [] []    Patellar Mobility [x] [] []    Patellar Orientation [x] [] []    Gait [] [x] [] Analysis: pt arrives with antalgic gait pattern with decreased R foot push off - improvement noted at end of treatment session         FUNCTION Normal Difficult Unable   Sitting [x] [] []   Standing [] [x] []   Ambulation [] [x] []   Groom/Dress [x] [] []   Lift/Carry [x] [] []   Stairs [] [x] []   Bending [x] [] []   Squat [] [x] []   Kneel [x] [] []     BALANCE/PROPRIOCEPTION              [] Not tested   Single leg stance       R                     L                                PAIN   Eyes open                   30        Sec. 30      Sec                  . []    Eyes closed                          Sec. Sec                  . []    Increased R medial arch collapse and impaired intrinsic ability on the R foot compared to the L    FUNCTIONAL TESTS PAIN NO PAIN COMMENTS   Step Test 4 [] []    6 [] []    8 [] []    Squat [x] [] DL squat - good depth and control  SL squat - good control, impaired depth secondary to impaired calf and foot mobility     Functional Test: Foot and Ankle Ability Measure (FAAM) Score: 66/84, 21.5% functionally impaired     Comments:    Assessment:  Shoaib Pacheco is a 39 y.o. female presenting following R tibial stress reaction sustained in Winter 2022. Pt arrives in comfortable walking shoe following being in a CAM walking boot for 6 weeks. Pt presents with physical therapy signs and symptoms consistent following R tibial stress reaction with no increased tenderness or pain upon palpation or with activity at this date. Pt does report increased R foot pain following walking on the treadmill for 10 minutes last night.  Pt presents with increased arch tenderness and fascia restrictions as well as impaired intrinsic foot control. Patient would benefit from skilled physical therapy services in order to: restore calf and foot mobility, strength and control in order to return to running and improve gait mechanics for decreased risk of reinjury with return to normal training program.     Problems:    [x] ? Pain: R foot pain, 7/10  [x] ? ROM: impaired calf and foot mobility  [x] ? Strength: impaired intrinsic foot strength  [x] ? Function: FAAM = 21.5% functional impairment  [x] Other: impaired walking and squat mechanics      STG: (to be met in 5 treatments)  1. ? Pain: Pt will report decreased pain levels to <3/10 with progression of PT exercises and return to running. 2. ? ROM: Pt will demonstrate full R foot and calf mobility when compared to the L LE in order to normalize squatting and running mechanics. 3. ? Strength: Pt will increase R ankle and foot strength to 5/5 globally in order to improve proprioceptive control for return to running. 4. ? Function: Pt will demonstrate ability to perform run gait analysis without increased pain levels noted. 5. Patient to be independent with home exercise program as demonstrated by performance with correct form without cues. LTG: (to be met in 10 treatments)  1. Pt will demonstrate ability to run for 5-10 minutes without gait abnormalities and without increased pain levels. 2. Pt will demonstrate improved functional activity tolerance as evident by an improved score on the FAAM to <15% functional impairment. 3. Pt will demonstrate ability to perform 5 single leg squats with good depth and control.                     Patient goals: \"get back to pain free running\"    Rehab Potential:  [x] Good  [] Fair  [] Poor   Suggested Professional Referral:  [x] No  [] Yes:  Barriers to Goal Achievement:  [x] No  [] Yes:  Domestic Concerns:  [x] No  [] Yes:    Pt. Education:  [x] Plans/Goals, Risks/Benefits discussed  [x] Home exercise program Method of Education: [x] Verbal  [x] Demo  [x] Written  Access Code: [email or web address]  URL: [email or web address]  Date: 03/03/2022  Prepared by: Chaitanya Dawn    Exercises  Arch Lifting - 1 x daily - 7 x weekly - 3 sets - 10 reps - 5 hold  Seated Lesser Toes Extension - 1 x daily - 7 x weekly - 3 sets - 10 reps  Seated Great Toe Extension - 1 x daily - 7 x weekly - 3 sets - 10 reps  Seated Self Great Toe Stretch - 1 x daily - 7 x weekly - 1 sets - 10 reps - 5 hold  Toe Spreading - 1 x daily - 7 x weekly - 3 sets - 10 reps  Long Sitting Calf Stretch with Strap - 1 x daily - 7 x weekly - 3 sets - 30 hold  Gastroc Stretch on Step - 1 x daily - 7 x weekly - 1 sets - 3 reps - 30 hold    Comprehension of Education:  [x] Verbalizes understanding. [x] Demonstrates understanding. [x] Needs Review. [] Demonstrates/verbalizes understanding of HEP/Ed previously given. Treatment Plan:  [x] Therapeutic Exercise   19942  [] Iontophoresis: 4 mg/mL Dexamethasone Sodium Phosphate  mAmin  24359   [] Therapeutic Activity  54681 [x] Vasopneumatic cold with compression  65642    [x] Gait Training   70163 [] Ultrasound   31473   [x] Neuromuscular Re-education  09101 [] Electrical Stimulation Unattended  53393   [x] Manual Therapy  08724 [] Electrical Stimulation Attended  21131   [x] Instruction in HEP  [] Lumbar/Cervical Traction  42913   [] Aquatic Therapy   09744 [x] Cold/hotpack    [] Massage   87492      [] Dry Needling, 1 or 2 muscles  12424   [] Biofeedback, first 15 minutes   16586  [] Biofeedback, additional 15 minutes   84402 [] Dry Needling, 3 or more muscles  14201     []  Medication allergies reviewed for use of    Dexamethasone Sodium Phosphate 4mg/ml     with iontophoresis treatments. Pt is not allergic.     Frequency:  1-2 x/week for 10 visits        Todays Treatment:  Modalities:   Precautions:  Exercises:  Exercise Reps/ Time Weight/ Level Comments   Manual therapy 10 min  - myofascial release to R plantar fascia, flexor digitorum, FHL  - hypervolt to R medial and lateral gastroc         Long sitting gastroc stretch 3x30\" strap          Seated toe yoga 2x10 ea     Seated arch lift 10x5\"     Seated toe splay 10x           Step calf stretch 3x30\"     Other:    Specific Instructions for next treatment: continue manual therapy as necessary, restore foot/calf mobility, progress proprioceptive and neuromuscular control, progress to return to running/gait analysis as appropriate      Evaluation Complexity:  History (Personal factors, comorbidities) [] 0 [x] 1-2 [] 3+   Exam (limitations, restrictions) [x] 1-2 [] 3 [] 4+   Clinical presentation (progression) [x] Stable [] Evolving  [] Unstable   Decision Making [x] Low [] Moderate [] High    [x] Low Complexity [] Moderate Complexity [] High Complexity       Treatment Charges: Mins Units   [x] Evaluation       [x]  Low       []  Moderate       []  High 30 1   []  Modalities     [x]  Ther Exercise 10 1   [x]  Manual Therapy 10 1   []  Ther Activities     []  Aquatics     []  Vasocompression     []  Other       TOTAL TREATMENT TIME: 50    Time in: 11:10 am   Time Out: 12:00 pm    Electronically signed by: Nicole Wells PT        Physician Signature:________________________________Date:__________________  By signing above or cosigning this note, I have reviewed this plan of care and certify a need for medically necessary rehabilitation services.      *PLEASE SIGN ABOVE AND FAX BACK ALL PAGES*

## 2022-03-10 ENCOUNTER — HOSPITAL ENCOUNTER (OUTPATIENT)
Dept: PHYSICAL THERAPY | Age: 37
Setting detail: THERAPIES SERIES
Discharge: HOME | End: 2022-03-10
Payer: COMMERCIAL

## 2022-03-10 PROCEDURE — 97110 THERAPEUTIC EXERCISES: CPT

## 2022-03-10 PROCEDURE — 97140 MANUAL THERAPY 1/> REGIONS: CPT

## 2022-03-10 NOTE — FLOWSHEET NOTE
[]? [address] [facility]  [facility]   [address]  P: [phone]  F: [phone] [x]? [address] [facility]  [address] [facility]   [address]  P: [phone]  F: [phone]       Physical Therapy Daily Treatment Note    Date:  3/10/2022  Patient Name:  Raysa Trinidad    :  1985  MRN: [id number]  Physician: Dr. Natalee Edwards,                                  Insurance: [employer] (60 Vs combined SLP/OT, HARD MAX)  Medical Diagnosis: Q11.559H - Medial tibial stress syndrome, right                        Rehab Codes: M79.671, M79.604, M62.81, R26.2  Onset date: 2021                 Next 's appt. :   Visit# / total visits: 2/10     Cancels/No Shows:     Subjective:  Denies pain upon arrival. Notes feeling reduced tension for a few days following manual at evaluation. Has been walking on her treadmill. Mentions 2nd digit on R foot causes her some pain and irritation at times.   Pain:  [] Yes  [x] No Location: R LE Pain Rating: (0-10 scale) 0/10  Pain altered Tx:  [x] No  [] Yes  Action:  Comments:    Objective:  Modalities:   Precautions:  Exercises:  Exercise Reps/ Time Weight/ Level Comments   Manual therapy 12 min   - myofascial release to R plantar fascia with passive extension of big toe (and STM to plantar fascia) , flexor digitorum, FHL  - hypervolt to R medial and lateral gastroc             Long sitting gastroc stretch 3x30\" strap               Seated toe yoga 2x10 ea       Seated arch lift 10x5\"       Seated toe splay 10x       Heel/toe raise  10x ea     Heel raise 2 way 10x ea  Toe in, toe out   4 way ankle 15x ea red              standing      Rocker board  10x ea     rocker board for balance  x30\" ea     SLS on floor, on foam  X30\" ea     Power steps 10x ea 6\" Fwd/lat   Tandem stance on foam with ball raise/rotation X30\" ea 4#    Squats  10x2     Step calf stretch 3x30\"       Other:     Specific Instructions for next treatment: continue manual therapy as necessary, restore foot/calf mobility, progress proprioceptive and neuromuscular control, progress to return to running/gait analysis as appropriate        Treatment Charges: Mins Units   []  Modalities     [x]  Ther Exercise 31 2   [x]  Manual Therapy 12 1   []  Ther Activities     []  Aquatics     []  Vasocompression     []  Other     Total Treatment time 43 3       Assessment: [x] Progressing toward goals. Initiated session with manual techniques to reduce muscular tension. Added STM to plantar fascia to further reduce tightness throughout. Instructed to avoid scrunching toes with arch lifts and to keep toe movement minimal. Heel raises-3 way added to improve gastroc strength and mobility. Implemented standing balance and strengthening activities as charted above. Ankle strategies noticed mostly in tandem stance on foam with ball rotation this date. Vc's to avoid rocking back on heels during squats. Encouraged to use frozen water bottle for ice massage to further reduce tension at plantar fascia. Pt tolerated tx very well. Will monitor sx's and progress as tolerated. [] No change. [] Other:  [x] Patient would continue to benefit from skilled physical therapy services in order to: restore calf and foot mobility, strength and control in order to return to running and improve gait mechanics for decreased risk of reinjury with return to normal training program.     STG/LTG  Problems:    [x]? ? Pain: R foot pain, 7/10  [x]? ? ROM: impaired calf and foot mobility  [x]? ? Strength: impaired intrinsic foot strength  [x]? ? Function: FAAM = 21.5% functional impairment  [x]? Other: impaired walking and squat mechanics          STG: (to be met in 5 treatments)  1. ? Pain: Pt will report decreased pain levels to <3/10 with progression of PT exercises and return to running.    2. ? ROM: Pt will demonstrate full R foot and calf mobility when compared to the L LE in order to normalize squatting and running mechanics. 3. ? Strength: Pt will increase R ankle and foot strength to 5/5 globally in order to improve proprioceptive control for return to running. 4. ? Function: Pt will demonstrate ability to perform run gait analysis without increased pain levels noted. 5. Patient to be independent with home exercise program as demonstrated by performance with correct form without cues. LTG: (to be met in 10 treatments)  1. Pt will demonstrate ability to run for 5-10 minutes without gait abnormalities and without increased pain levels. 2. Pt will demonstrate improved functional activity tolerance as evident by an improved score on the FAAM to <15% functional impairment. 3. Pt will demonstrate ability to perform 5 single leg squats with good depth and control.                     Patient goals: \"get back to pain free running\"    Pt. Education:  [x] Yes  [] No  [x] Reviewed Prior HEP/Ed  Method of Education: [x] Verbal  [] Demo  [] Written  Access Code: [email or web address]  URL: [email or web address]  Date: 03/03/2022  Prepared by: Becky Mock     Exercises  Arch Lifting - 1 x daily - 7 x weekly - 3 sets - 10 reps - 5 hold  Seated Lesser Toes Extension - 1 x daily - 7 x weekly - 3 sets - 10 reps  Seated Great Toe Extension - 1 x daily - 7 x weekly - 3 sets - 10 reps  Seated Self Great Toe Stretch - 1 x daily - 7 x weekly - 1 sets - 10 reps - 5 hold  Toe Spreading - 1 x daily - 7 x weekly - 3 sets - 10 reps  Long Sitting Calf Stretch with Strap - 1 x daily - 7 x weekly - 3 sets - 30 hold  Gastroc Stretch on Step - 1 x daily - 7 x weekly - 1 sets - 3 reps - 30 hold     Comprehension of Education:  [x] Verbalizes understanding. [] Demonstrates understanding. [] Needs review. [] Demonstrates/verbalizes HEP/Ed previously given. Plan: [x] Continue current frequency toward long and short term goals.     [] Specific Instructions for subsequent treatments:       Time In: 4:15 Time Out: 4:58    Electronically signed by:  Anayeli Noble PTA

## 2022-03-17 ENCOUNTER — HOSPITAL ENCOUNTER (OUTPATIENT)
Dept: PHYSICAL THERAPY | Age: 37
Setting detail: THERAPIES SERIES
Discharge: HOME | End: 2022-03-17
Payer: COMMERCIAL

## 2022-03-17 PROCEDURE — 97110 THERAPEUTIC EXERCISES: CPT

## 2022-03-17 NOTE — FLOWSHEET NOTE
[]? [address] [facility]  [facility]   [address]  P: [phone]  F: [phone] [x]? [address] [facility]  [address] [facility]   [address]  P: [phone]  F: [phone]       Physical Therapy Daily Treatment Note    Date:  3/17/2022  Patient Name:  Andrei Upton    :  1985  MRN: [id number]  Physician: Dr. Aguilar Valdovinos, DO                                 Insurance: [employer] (60 Vs combined SLP/OT, HARD MAX)  Medical Diagnosis: C20.948I - Medial tibial stress syndrome, right                        Rehab Codes: M79.671, M79.604, M62.81, R26.2  Onset date: 2021                 Next 's appt. :   Visit# / total visits: 3/10     Cancels/No Shows:     Subjective:  Pt arrives reporting that she feels good, no increased arch or leg pain noted.    Pain:  [] Yes  [x] No Location: R LE Pain Rating: (0-10 scale) 0/10  Pain altered Tx:  [x] No  [] Yes  Action:  Comments:    Objective:  Modalities:   Precautions:  Exercises:  Exercise Reps/ Time Weight/ Level Completed 22 Comments   Manual therapy 12 min    - myofascial release to R plantar fascia with passive extension of big toe (and STM to plantar fascia) , flexor digitorum, FHL  - hypervolt to R medial and lateral gastroc              Long sitting gastroc stretch 3x30\" strap x                Seated toe yoga 2x10 ea        Seated arch lift 10x5\"        Seated toe splay 10x        Heel/toe raise  10x ea      Heel raise 2 way 10x ea   Toe in, toe out   4 way ankle 15x ea red                standing       Rocker board  10x ea  x    rocker board for balance  x30\" ea  x    SLS on floor, on foam  2x30\" ea  x Inc reps 3/17   Power steps 10x2 ea 6\" x Fwd/lat  Inc reps 3/17   Tandem stance on foam with ball raise/rotation x30\" ea 4# x    Squats  10x2  x    Step calf stretch 3x30\"   x     Ladder drills 5 min  x Added 3/17   Other:     Specific Instructions for next treatment: continue manual therapy as necessary, restore foot/calf mobility, progress proprioceptive and neuromuscular control, progress to return to running/gait analysis as appropriate        Treatment Charges: Mins Units   []  Modalities     [x]  Ther Exercise 34 2   []  Manual Therapy     []  Ther Activities     []  Aquatics     []  Vasocompression     []  Other     Total Treatment time 34 2       Assessment: [x] Progressing toward goals. Held manual therapy at this date due to pt reporting no increases in arch pain over the past week. Exercises performed as charted above with focus on improving control and dynamic stability. Added ladder drills to assess push off and change in direction with pt reporting good tolerance and good mechanics evident. Pt instructed to trial running/walking intervals over the next week. [] No change. [] Other:  [x] Patient would continue to benefit from skilled physical therapy services in order to: restore calf and foot mobility, strength and control in order to return to running and improve gait mechanics for decreased risk of reinjury with return to normal training program.     STG/LTG  Problems:    [x]? ? Pain: R foot pain, 7/10  [x]? ? ROM: impaired calf and foot mobility  [x]? ? Strength: impaired intrinsic foot strength  [x]? ? Function: FAAM = 21.5% functional impairment  [x]? Other: impaired walking and squat mechanics          STG: (to be met in 5 treatments)  1. ? Pain: Pt will report decreased pain levels to <3/10 with progression of PT exercises and return to running. 2. ? ROM: Pt will demonstrate full R foot and calf mobility when compared to the L LE in order to normalize squatting and running mechanics. 3. ? Strength: Pt will increase R ankle and foot strength to 5/5 globally in order to improve proprioceptive control for return to running. 4. ?  Function: Pt will demonstrate ability to perform run gait analysis without increased pain levels noted.  5. Patient to be independent with home exercise program as demonstrated by performance with correct form without cues. LTG: (to be met in 10 treatments)  1. Pt will demonstrate ability to run for 5-10 minutes without gait abnormalities and without increased pain levels. 2. Pt will demonstrate improved functional activity tolerance as evident by an improved score on the FAAM to <15% functional impairment. 3. Pt will demonstrate ability to perform 5 single leg squats with good depth and control.                     Patient goals: \"get back to pain free running\"    Pt. Education:  [x] Yes  [] No  [x] Reviewed Prior HEP/Ed  Method of Education: [x] Verbal  [] Demo  [] Written  Access Code: [email or web address]  URL: [email or web address]  Date: 03/03/2022  Prepared by: Danya Payan     Exercises  Arch Lifting - 1 x daily - 7 x weekly - 3 sets - 10 reps - 5 hold  Seated Lesser Toes Extension - 1 x daily - 7 x weekly - 3 sets - 10 reps  Seated Great Toe Extension - 1 x daily - 7 x weekly - 3 sets - 10 reps  Seated Self Great Toe Stretch - 1 x daily - 7 x weekly - 1 sets - 10 reps - 5 hold  Toe Spreading - 1 x daily - 7 x weekly - 3 sets - 10 reps  Long Sitting Calf Stretch with Strap - 1 x daily - 7 x weekly - 3 sets - 30 hold  Gastroc Stretch on Step - 1 x daily - 7 x weekly - 1 sets - 3 reps - 30 hold     Comprehension of Education:  [x] Verbalizes understanding. [] Demonstrates understanding. [] Needs review. [] Demonstrates/verbalizes HEP/Ed previously given. Plan: [x] Continue current frequency toward long and short term goals.     [] Specific Instructions for subsequent treatments:       Time In: 4:43 pm            Time Out: 5:09 pm    Electronically signed by:  Danya Payan PT

## 2022-03-21 ENCOUNTER — HOSPITAL ENCOUNTER (OUTPATIENT)
Dept: PHYSICAL THERAPY | Age: 37
Setting detail: THERAPIES SERIES
Discharge: HOME | End: 2022-03-21
Payer: COMMERCIAL

## 2022-03-21 PROCEDURE — 97116 GAIT TRAINING THERAPY: CPT

## 2022-03-21 PROCEDURE — 97110 THERAPEUTIC EXERCISES: CPT

## 2022-03-21 NOTE — FLOWSHEET NOTE
[] [address] [facility] [address]   [address]  P: [phone]  F: [phone] [x] [address] [facility]  [address] [facility]   [address]  P: [phone]  F: [phone]       Physical Therapy Daily Treatment Note    Date:  3/21/2022  Patient Name:  Raysa Trinidad    :  1985  MRN: [id number]  Physician: Dr. Natalee Edwards,                                  Insurance: [employer] (60 Vs combined SLP/OT, HARD MAX)  Medical Diagnosis: B47.633C - Medial tibial stress syndrome, right                        Rehab Codes: M79.671, M79.604, M62.81, R26.2  Onset date: 2021                 Next 's appt. :   Visit# / total visits: 4/10     Cancels/No Shows:     Subjective:  Pt stated she ran this weekend and did not have any issues. She has no pain today or any over the weekend.    Pain:  [] Yes  [x] No Location: R LE Pain Rating: (0-10 scale) 0/10  Pain altered Tx:  [x] No  [] Yes  Action:  Comments:    Objective:  Modalities:   Precautions:  Exercises:  Exercise Reps/ Time Weight/ Level Completed 22 Comments   Manual therapy 12 min    - myofascial release to R plantar fascia with passive extension of big toe (and STM to plantar fascia) , flexor digitorum, FHL  - hypervolt to R medial and lateral gastroc              Long sitting gastroc stretch 3x30\" strap x                Seated toe yoga 2x10 ea        Seated arch lift 10x5\"        Seated toe splay 10x        Heel/toe raise  10x ea      Heel raise 2 way 10x ea   Toe in, toe out   4 way ankle 15x ea red                standing       Rocker board  10x ea  x    rocker board for balance  x30\" ea  x    SLS on floor, on foam  2x30\" ea  x Inc reps 3/17  Shoes off 3/21   Power steps 10x2 ea 6\" x Fwd/lat  Inc reps 3/17  Added foam 3/21   Tandem stance on foam with ball raise/rotation x30\" ea 4# x Added chop D1 and D2 3/21   Squats  10x2  x    Step calf stretch 3x30\" x     Ladder drills 5 min   Added 3/17   Other:     Specific Instructions for next treatment: continue manual therapy as necessary, restore foot/calf mobility, progress proprioceptive and neuromuscular control, progress to return to running/gait analysis as appropriate    Completed by Kathy Khan 3/21/22 ROM  ° A/P STRENGTH     Left Right Left Right   Ankle DF (knee straight) 22 6 5/5 5/5   DF (knee bent)         PF 40 34 5/5 5/5   INV 52 48 5/5 5/5   EVER 20 20 5/5 5/5   GTE 20 18 4/5 4+/5     Video Run Analysis 3/21/22   Warm up: 2 min   Pace: 10:55  min/mile   Duration: 5 minutes    Shoes: NB neutral shoe   Adrienne: 176 spm    Frontal plane deviations:    Increased pronation     Recommendations:     Continue to work on intrinsic strength, look into mild stability shoe with next pair of shoes          Treatment Charges: Mins Units   []  Modalities     [x]  Ther Exercise 30 2   []  Manual Therapy     []  Ther Activities     []  Aquatics     []  Vasocompression     [x]  Other: gait 9 1   Total Treatment time 39 3       Assessment: [x] Progressing toward goals. Pt reports no increase of pain over the weekend with running and none coming in today. Exercises performed and progressed as noted above to improve control for running. Pt demonstrated mild arch collapse during running analysis but no other impairments noted. Pt's strength and ROM of the R foot and ankle is improved. Pt was educated to continue exercises and stretch as she still demonstrates limited dorsiflexion ROM. Pt was encouraged to increase running frequency gradually. Pt was told to let us know if she has any problems and chart will be held open for 2-3 weeks for patient to trial independent HEP. Goals have been met or partially met as listed below. [] No change.      [] Other:  [x] Patient would continue to benefit from skilled physical therapy services in order to: restore calf and foot mobility, strength and control in order to return to running and improve gait mechanics for decreased risk of reinjury with return to normal training program.     STG/LTG  Problems:    [x] ? Pain: R foot pain, 7/10  [x] ? ROM: impaired calf and foot mobility  [x] ? Strength: impaired intrinsic foot strength  [x] ? Function: FAAM = 21.5% functional impairment  [x] Other: impaired walking and squat mechanics          STG: (to be met in 5 treatments)  ? Pain: Pt will report decreased pain levels to <3/10 with progression of PT exercises and return to running.  - MET 3/21/22  ? ROM: Pt will demonstrate full R foot and calf mobility when compared to the L LE in order to normalize squatting and running mechanics. - partially met 3/21/22; continues to demonstrate impaired R dorsiflexion ROM  ? Strength: Pt will increase R ankle and foot strength to 5/5 globally in order to improve proprioceptive control for return to running. - MET 3/21/22  ? Function: Pt will demonstrate ability to perform run gait analysis without increased pain levels noted. - MET 3/21/22   Patient to be independent with home exercise program as demonstrated by performance with correct form without cues. - MET 3/21/22  LTG: (to be met in 10 treatments)  Pt will demonstrate ability to run for 5-10 minutes without gait abnormalities and without increased pain levels. - MET 3/21/22  Pt will demonstrate improved functional activity tolerance as evident by an improved score on the FAAM to <15% functional impairment. - MET 3/21/22; currently 1.2% impairment (83/84)  Pt will demonstrate ability to perform 5 single leg squats with good depth and control. - MET 3/21/22                    Patient goals: \"get back to pain free running\"    Pt. Education:  [x] Yes  [] No  [x] Reviewed Prior HEP/Ed  Method of Education: [x] Verbal  [] Demo  [] Written  Access Code: [email or web address]  URL: [email or web address]  Date: 03/03/2022  Prepared by: Fabian Cabrera     Exercises  Arch Lifting - 1 x daily - 7 x weekly - 3 sets - 10 reps - 5 hold  Seated Lesser Toes Extension - 1 x daily - 7 x weekly - 3 sets - 10 reps  Seated Great Toe Extension - 1 x daily - 7 x weekly - 3 sets - 10 reps  Seated Self Great Toe Stretch - 1 x daily - 7 x weekly - 1 sets - 10 reps - 5 hold  Toe Spreading - 1 x daily - 7 x weekly - 3 sets - 10 reps  Long Sitting Calf Stretch with Strap - 1 x daily - 7 x weekly - 3 sets - 30 hold  Gastroc Stretch on Step - 1 x daily - 7 x weekly - 1 sets - 3 reps - 30 hold     Comprehension of Education:  [x] Verbalizes understanding. [] Demonstrates understanding. [] Needs review. [] Demonstrates/verbalizes HEP/Ed previously given. Plan: [x] Continue current frequency toward long and short term goals.     [] Specific Instructions for subsequent treatments:       Time In: 9:00 am            Time Out: 9:39 am    Electronically signed by:  OMER Yuen  This documentation has been reviewed and entirety of treatment session with direct supervision by clinical instructor, Earline Hearn PT, DPT

## 2022-04-08 DIAGNOSIS — M79.661 PAIN IN RIGHT SHIN: Primary | ICD-10-CM

## 2022-04-11 ENCOUNTER — OFFICE VISIT (OUTPATIENT)
Dept: ORTHOPEDIC SURGERY | Age: 37
End: 2022-04-11
Payer: COMMERCIAL

## 2022-04-11 DIAGNOSIS — S86.891S MEDIAL TIBIAL STRESS SYNDROME, RIGHT, SEQUELA: Primary | ICD-10-CM

## 2022-04-11 PROCEDURE — 99213 OFFICE O/P EST LOW 20 MIN: CPT | Performed by: FAMILY MEDICINE

## 2022-04-11 RX ORDER — CALCIUM CARBONATE 500(1250)
500 TABLET ORAL DAILY
COMMUNITY

## 2022-04-11 NOTE — PROGRESS NOTES
Financial Resource Strain:     Difficulty of Paying Living Expenses: Not on file   Food Insecurity:     Worried About Running Out of Food in the Last Year: Not on file    Katerine of Food in the Last Year: Not on file   Transportation Needs:     Lack of Transportation (Medical): Not on file    Lack of Transportation (Non-Medical): Not on file   Physical Activity:     Days of Exercise per Week: Not on file    Minutes of Exercise per Session: Not on file   Stress:     Feeling of Stress : Not on file   Social Connections:     Frequency of Communication with Friends and Family: Not on file    Frequency of Social Gatherings with Friends and Family: Not on file    Attends [employer] Services: Not on file    Active Member of [address] [place] [address] [employer] or Organizations: Not on file    Attends Club or Organization Meetings: Not on file    Marital Status: Not on file   Intimate Partner Violence:     Fear of Current or Ex-Partner: Not on file    Emotionally Abused: Not on file    Physically Abused: Not on file    Sexually Abused: Not on file   Housing Stability:     Unable to Pay for Housing in the Last Year: Not on file    Number of [place] in the Last Year: Not on file    Unstable Housing in the Last Year: Not on file       Current Outpatient Medications   Medication Sig Dispense Refill    calcium carbonate (OSCAL) 500 MG TABS tablet Take 500 mg by mouth daily      VITAMIN D PO Take by mouth      etonogestrel-ethinyl estradiol (NUVARING) 0.12-0.015 MG/24HR vaginal ring INSERT ONE RING VAGINALLY AND LEAVE IN PLACE FOR 3 WEEKS, THEN REMOVE FOR ONE WEEK 3 each 3     No current facility-administered medications for this visit. Allergies:  shehas No Known Allergies. ROS:  CV:  Denies chest pain; palpitations; shortness of breath; swelling of feet, ankles; and loss of consciousness. CON: Denies fever and dizziness. ENT:  Denies hearing loss / ringing, ear infections hoarseness, and swallowing problems.   RESP: Denies chronic cough, spitting up blood, and asthma/wheezing. GI: Denies abdominal pain, change in bowel habits, nausea or vomiting, and blood in stools. :  Denies frequent urination, burning or painful urination, blood in the urine, and bladder incontinence. NEURO:  Denies headache, memory loss, sleep disturbance, and tremor or movement disorder. PHYSICAL EXAM:   There were no vitals taken for this visit. GENERAL: Earline Muller is a 44 y.o. female who is alert and oriented and sitting comfortably in our office. SKIN:  Intact without rashes, lesions or ulcerations. NEURO: Sensation to the extremity is intact. VASC:  Capillary refill is less than 3 seconds. There is no lymphadenopathy. Knee Exam  Musculoskeletal/Neurologic:  Inspection-Swelling: none,   Palpation-Tenderness:no knee ttp  ROM-0-135  Strength- WNL    Tibia/Fibula exam  Tenderness diffuse sharp ttp medial tibia  The patient is  able to single toe raise. Single leg hop test: positive     Ankle Exam:    Reveals there is not effusion. Swelling is not present. Edema is not not present. Ecchymoses is not present. Palpation-Tenderness no ankle ttp  The foot is in normal alignment. ROM:  40 degrees plantarflexion and 20 degrees dorsiflexion. Subtalar motion is 30 degrees inversion and 20 degrees eversion. Strength-WNL    Gait: Antalgic    PSYCH:  Patient has good fund of knowledge and displays understanding of exam.    RADIOLOGY: No results found. Repeat 2 view radiographs of the right tib-fib failed to demonstrate any obvious osseous abnormalities no obvious fractures or dislocations are noted on plain film radiographs of the right tibia or fibula    IMPRESSION:     1. Medial tibial stress syndrome, right, sequela        PLAN:   We discussed some of the etiologies and natural histories of     ICD-10-CM    1.  Medial tibial stress syndrome, right, sequela  S86.891S MRI TIBIA FIBULA RIGHT WO CONTRAST    We discussed the various treatment alternatives including anti-inflammatory medications, physical therapy, injections, further imaging studies and as a last resort surgery. At this point patient is very much struggling with fairly significant issues we will get a MRI as she has failed conservative management see her back based on the results of the. She voiced understanding agreement this plan. No follow-ups on file.     Please be aware portions of this note were completed using voice recognition software and unforeseen errors may have occurred    Electronically signed by Antonio Villalpando DO, FAOASM  on 4/11/22 at 8:50 AM EDT

## 2022-04-15 ENCOUNTER — HOSPITAL ENCOUNTER (OUTPATIENT)
Dept: MRI IMAGING | Facility: CLINIC | Age: 37
Discharge: HOME | End: 2022-04-17
Payer: COMMERCIAL

## 2022-04-15 DIAGNOSIS — S86.891S MEDIAL TIBIAL STRESS SYNDROME, RIGHT, SEQUELA: ICD-10-CM

## 2022-04-15 PROCEDURE — 73718 MRI LOWER EXTREMITY W/O DYE: CPT

## 2022-04-18 ENCOUNTER — PATIENT MESSAGE (OUTPATIENT)
Dept: ORTHOPEDIC SURGERY | Age: 37
End: 2022-04-18

## 2022-04-18 ENCOUNTER — TELEPHONE (OUTPATIENT)
Dept: ORTHOPEDIC SURGERY | Age: 37
End: 2022-04-18

## 2022-04-19 NOTE — TELEPHONE ENCOUNTER
Spoke with patient with results. Advised she could gradually get back into running. Did advise she could schedule additional follow up's with PT to continue to work on strength/ROM and form to help prevent possible future issues with her shin.

## 2022-10-24 DIAGNOSIS — N94.6 DYSMENORRHEA: ICD-10-CM

## 2022-10-24 RX ORDER — ETONOGESTREL AND ETHINYL ESTRADIOL 11.7; 2.7 MG/1; MG/1
INSERT, EXTENDED RELEASE VAGINAL
Qty: 3 EACH | Refills: 3 | Status: SHIPPED | OUTPATIENT
Start: 2022-10-24

## 2022-11-29 ENCOUNTER — HOSPITAL ENCOUNTER (OUTPATIENT)
Age: 37
Setting detail: SPECIMEN
Discharge: HOME | End: 2022-11-29

## 2022-11-29 DIAGNOSIS — Z13.9 SCREENING FOR CONDITION: ICD-10-CM

## 2022-11-29 DIAGNOSIS — E04.1 THYROID NODULE: ICD-10-CM

## 2022-11-29 DIAGNOSIS — E55.9 VITAMIN D INSUFFICIENCY: ICD-10-CM

## 2022-11-29 DIAGNOSIS — Z87.312 HISTORY OF STRESS FRACTURE: ICD-10-CM

## 2022-11-29 LAB
ABSOLUTE EOS #: 0.2 K/UL (ref 0–0.44)
ABSOLUTE IMMATURE GRANULOCYTE: 0.03 K/UL (ref 0–0.3)
ABSOLUTE LYMPH #: 2.39 K/UL (ref 1.1–3.7)
ABSOLUTE MONO #: 0.48 K/UL (ref 0.1–1.2)
ALBUMIN SERPL-MCNC: 4.3 G/DL (ref 3.5–5.2)
ALBUMIN/GLOBULIN RATIO: 1.3 (ref 1–2.5)
ALP BLD-CCNC: 51 U/L (ref 35–104)
ALT SERPL-CCNC: 15 U/L (ref 5–33)
ANION GAP SERPL CALCULATED.3IONS-SCNC: 9 MMOL/L (ref 9–17)
AST SERPL-CCNC: 23 U/L
BASOPHILS # BLD: 1 % (ref 0–2)
BASOPHILS ABSOLUTE: 0.09 K/UL (ref 0–0.2)
BILIRUB SERPL-MCNC: 0.6 MG/DL (ref 0.3–1.2)
BUN BLDV-MCNC: 11 MG/DL (ref 6–20)
CALCIUM SERPL-MCNC: 9.4 MG/DL (ref 8.6–10.4)
CHLORIDE BLD-SCNC: 101 MMOL/L (ref 98–107)
CHOLESTEROL/HDL RATIO: 3.2
CHOLESTEROL: 187 MG/DL
CO2: 26 MMOL/L (ref 20–31)
CREAT SERPL-MCNC: 0.78 MG/DL (ref 0.5–0.9)
EOSINOPHILS RELATIVE PERCENT: 3 % (ref 1–4)
GFR SERPL CREATININE-BSD FRML MDRD: >60 ML/MIN/1.73M2
GLUCOSE BLD-MCNC: 97 MG/DL (ref 70–99)
HCT VFR BLD CALC: 41.1 % (ref 36.3–47.1)
HDLC SERPL-MCNC: 58 MG/DL
HEMOGLOBIN: 13.8 G/DL (ref 11.9–15.1)
IMMATURE GRANULOCYTES: 0 %
LDL CHOLESTEROL: 109 MG/DL (ref 0–130)
LYMPHOCYTES # BLD: 35 % (ref 24–43)
MCH RBC QN AUTO: 29.7 PG (ref 25.2–33.5)
MCHC RBC AUTO-ENTMCNC: 33.6 G/DL (ref 28.4–34.8)
MCV RBC AUTO: 88.6 FL (ref 82.6–102.9)
MONOCYTES # BLD: 7 % (ref 3–12)
NRBC AUTOMATED: 0 PER 100 WBC
PDW BLD-RTO: 11.6 % (ref 11.8–14.4)
PLATELET # BLD: 392 K/UL (ref 138–453)
PMV BLD AUTO: 9.8 FL (ref 8.1–13.5)
POTASSIUM SERPL-SCNC: 4.3 MMOL/L (ref 3.7–5.3)
RBC # BLD: 4.64 M/UL (ref 3.95–5.11)
SEG NEUTROPHILS: 54 % (ref 36–65)
SEGMENTED NEUTROPHILS ABSOLUTE COUNT: 3.56 K/UL (ref 1.5–8.1)
SODIUM BLD-SCNC: 136 MMOL/L (ref 135–144)
THYROXINE, FREE: 1.35 NG/DL (ref 0.93–1.7)
TOTAL PROTEIN: 7.7 G/DL (ref 6.4–8.3)
TRIGL SERPL-MCNC: 99 MG/DL
TSH SERPL DL<=0.05 MIU/L-ACNC: 2.27 UIU/ML (ref 0.3–5)
VITAMIN D 25-HYDROXY: 37.3 NG/ML
WBC # BLD: 6.8 K/UL (ref 3.5–11.3)

## 2023-02-27 ENCOUNTER — HOSPITAL ENCOUNTER (OUTPATIENT)
Age: 38
Setting detail: SPECIMEN
Discharge: HOME | End: 2023-02-27

## 2023-02-27 ENCOUNTER — OFFICE VISIT (OUTPATIENT)
Dept: OBGYN CLINIC | Age: 38
End: 2023-02-27
Payer: COMMERCIAL

## 2023-02-27 DIAGNOSIS — Z01.419 WELL FEMALE EXAM WITH ROUTINE GYNECOLOGICAL EXAM: Primary | ICD-10-CM

## 2023-02-27 DIAGNOSIS — N94.6 DYSMENORRHEA: ICD-10-CM

## 2023-02-27 DIAGNOSIS — Z11.51 SPECIAL SCREENING EXAMINATION FOR HUMAN PAPILLOMAVIRUS (HPV): ICD-10-CM

## 2023-02-27 PROBLEM — S86.899A SHIN SPLINT: Status: ACTIVE | Noted: 2023-02-27

## 2023-02-27 PROCEDURE — 99395 PREV VISIT EST AGE 18-39: CPT | Performed by: NURSE PRACTITIONER

## 2023-02-27 RX ORDER — ETONOGESTREL AND ETHINYL ESTRADIOL 11.7; 2.7 MG/1; MG/1
INSERT, EXTENDED RELEASE VAGINAL
Qty: 3 EACH | Refills: 3 | Status: SHIPPED | OUTPATIENT
Start: 2023-02-27

## 2023-02-27 NOTE — PROGRESS NOTES
History and Physical  [address], [address] [address], [place] [address]. [phone]   Fax [phone]  Tegan Andrade  2023              44 y.o. Chief Complaint   Patient presents with    \[facility]\"" Care    Annual Exam       No LMP recorded. Primary Care Physician: SHAGGY Reyes CNP    The patient was seen and examined. She has no chief complaint today and is here for her annual exam.  Her bowels are regular. There are no voiding complaints. She denies any bloating. She denies vaginal discharge and was counseled on STD's and the need for barrier contraception. HPI : Tegan Andrade is a 44 y.o. female     Annual exam   NO CC  States her last office had her on Nuvaring for PMDD sx's  Desires to continue  States she is having more anxiety/depression the week prior to menses as she is getting older  Declining medication for depression      no Bloating  no Early Satiety  no Unexplained weight change of more than 15 lbs  no  PMB  no  PCB  ________________________________________________________________________  OB History    Para Term  AB Living   3 3 3 0 0 3   SAB IAB Ectopic Molar Multiple Live Births   0 0 0 0 0 3      # Outcome Date GA Lbr Alex/2nd Weight Sex Delivery Anes PTL Lv   3 Term 16 40w6d   F Vag-Spont      2 Term 12   8 lb 7 oz (3.827 kg) F Vag-Spont   CURTIS      Birth Comments: System Generated. Please review and update pregnancy details.    1 Term 11/29/10 41w2d 12:00 8 lb 5 oz (3.771 kg) M Vag-Spont EPI       Past Medical History:   Diagnosis Date    Hip fracture ([address] 2017    Shin splint     Stress fracture of hip                                                                    Past Surgical History:   Procedure Laterality Date    IR BIOPSY THYROID PERC CORE NEEDLE  01/10/2022    IR BIOPSY THYROID PERC CORE NEEDLE 1/10/2022 [facility] SPECIAL PROCEDURES    WISDOM TOOTH EXTRACTION       Family History   Problem Relation Age of Onset    Emphysema Mother     Heart Attack Father         x2    Depression Maternal Grandmother     Diabetes Paternal Grandmother     Heart Disease Paternal Grandfather     Other Maternal Uncle      Social History     Socioeconomic History    Marital status:      Spouse name: Mahesh    Number of children: Not on file    Years of education: Not on file    Highest education level: Not on file   Occupational History    Occupation:     Tobacco Use    Smoking status: Never    Smokeless tobacco: Never   Vaping Use    Vaping Use: Some days   Substance and Sexual Activity    Alcohol use: Yes     Alcohol/week: 0.0 standard drinks     Comment: social    Drug use: No    Sexual activity: Yes     Partners: Male     Birth control/protection: Ring   Other Topics Concern    Not on file   Social History Narrative    Not on file     Social Determinants of Health     Financial Resource Strain: Low Risk     Difficulty of Paying Living Expenses: Not hard at all   Food Insecurity: No Food Insecurity    Worried About [address] in the Last Year: Never true    [address] in the Last Year: Never true   Transportation Needs: Unknown    Lack of Transportation (Medical): Not on file    Lack of Transportation (Non-Medical): No   Physical Activity: Not on file   Stress: Not on file   Social Connections: Not on file   Intimate Partner Violence: Not on file   Housing Stability: Unknown    Unable to Pay for Housing in the Last Year: Not on file    Number of Places Lived in the Last Year: Not on file    Unstable Housing in the Last Year: No       MEDICATIONS:  Current Outpatient Medications   Medication Sig Dispense Refill    etonogestrel-ethinyl estradiol (NUVARING) 0.12-0.015 MG/24HR vaginal ring INSERT ONE RING VAGINALLY AND LEAVE IN PLACE FOR 3 WEEKS, THEN REMOVE FOR ONE WEEK 3 each 3    triamcinolone (KENALOG) 0.1 % cream Apply topically 2 times daily.  45 g 0     No current facility-administered medications for this visit. ALLERGIES:  Allergies as of 02/27/2023    (No Known Allergies)       Symptoms of decreased mood absent  Symptoms of anhedonia absent    **If either question is answered in a  positive fashion then complete the PHQ9 Scoring Evaluation and make the appropriate referral**      Immunization status: up to date and documented. Gynecologic History:  Menarche: 14 yo  Menopause at [age] yo     No LMP recorded. Sexually Active: Yes    STD History: No     Permanent Sterilization: No   Reversible Birth Control: Nvaring        Hormone Replacement Exposure: No      Genetic Qualified Family History of Breast, Ovarian , Colon or Uterine Cancer: No     If YES see scanned worksheet. Preventative Health Testing:    Health Maintenance:  Health Maintenance Due   Topic Date Due    Varicella vaccine (1 of 2 - 2-dose childhood series) Never done    COVID-19 Vaccine (4 - Booster for Moderna series) 02/28/2022    Flu vaccine (1) 08/01/2022    Cervical cancer screen  10/13/2022       Date of Last Pap Smear: 10/17/2017 neg/neg  Abnormal Pap Smear History: denies  Colposcopy History:   Date of Last Mammogram: NA  Date of Last Colonoscopy:   Date of Last Bone Density:      ________________________________________________________________________        REVIEW OF SYSTEMS:     see problem list    A minimum of an eleven point review of systems was completed. Review Of Systems (11 point):  Constitutional: No fever, chills or malaise;  No weight change or fatigue  Head and Eyes: No vision changes, Headache, Dizziness or trauma in last 12 months  ENT ROS: No hearing, Tinnitis, sinus or taste problems  Hematological and Lymphatic ROS:No Lymphoma, Von Willebrand's, Hemophillia or Bleeding History  Psych ROS: No Depression, Homicidal thoughts,suicidal thoughts, or anxiety  Breast ROS: No breast abnormalities or lumps  Respiratory ROS: No SOB, Pneumoniae,Cough, or Pulmonary Embolism Cardiovascular ROS: No Chest Pain with Exertion, Palpitations, Syncope, Edema, Arrhythmia  Gastrointestinal ROS: No Indigestion, Heartburn, Nausea, vomiting, Diarrhea, Constipation,or Bowel Changes; No Bloody Stools or melena  Genito-Urinary ROS: No Dysuria, Hematuria or Nocturia. No Urinary Incontinence or Vaginal Discharge  Musculoskeletal ROS: No Arthralgia, Arthritis,Gout,Osteoporosis or Rheumatism  Neurological ROS: No CVA, Migraines, Epilepsy, Seizure Hx, or Limb Weakness  Dermatological ROS: No Rash, Itching, Hives, Mole Changes or Cancer                                                                                                                                                                                                                                  PHYSICAL Exam:     Constitutional:  There were no vitals filed for this visit. Chaperone for Intimate Exam  Chaperone was offered and accepted as part of the rooming process. Chaperone: NESS SPENCER          General Appearance: This  is a well Developed, well Nourished, well groomed female. Her BMI was reviewed. Nutritional decision making was discussed. Skin:  There was a Normal Inspection of the skin without rashes or lesions. There were no rashes. (Papular, Maculopapular, Hives, Pustular, Macular)     There were no lesions (Ulcers, Erythema, Abn. Appearing Nevi)            Lymphatic:  No Lymph Nodes were Palpable in the neck , axilla or groin.  0 # Of Lymph Nodes; Location ; Character [Normal]  [Shotty] [Tender] [Enlarged]     Neck and EENT:  The neck was supple. There were no masses   The thyroid was not enlarged and had no masses. Perrla, EOMI B/L, TMI B/L No Abnormalities. Throat inspected-No exudates or Masses, Nares Patent No Masses        Respiratory: The lungs were auscultated and found to be clear. There were no rales, rhonchi or wheezes. There was a good respiratory effort. Cardiovascular:   The heart was in a regular rate and rhythm. . No S3 or S4. There was no murmur appreciated. Location, grade, and radiation are not applicable. Extremities: The patients extremities were without calf tenderness, edema, or varicosities. There was full range of motion in all four extremities. Pulses in all four extremities were appreciated and are 2/4. Abdomen: The abdomen was soft and non-tender. There were good bowel sounds in all quadrants and there was no guarding, rebound or rigidity. On evaluation there was no evidence of hepatosplenomegaly and there was no costal vertebral lex tenderness bilaterally. No hernias were appreciated. Abdominal Scars: none    Psych: The patient had a normal Orientation to: Time, Place, Person, and Situation  There is no Mood / Affect changes    Breast:  (Chest)  normal appearance, no masses or tenderness, Inspection negative, No nipple retraction or dimpling, No nipple discharge or bleeding, No axillary or supraclavicular adenopathy, Normal to palpation without dominant masses  Self breast exams were reviewed in detail. Literature was given. Pelvic Exam:  External genitalia: normal general appearance  Urinary system: urethral meatus normal  Vaginal: normal mucosa without prolapse or lesions  Cervix: normal appearance  Adnexa: normal bimanual exam  Uterus: normal single, nontender    Rectal Exam:  exam declined by patient          Musculosk:  Normal Gait and station was noted. Digits were evaluated without abnormal findings. Range of motion, stability and strength were evaluated and found to be appropriate for the patients age. ASSESSMENT:      44 y.o. Annual   Diagnosis Orders   1. Well female exam with routine gynecological exam  PAP SMEAR      2. Special screening examination for human papillomavirus (HPV)  PAP SMEAR      3.  Dysmenorrhea  etonogestrel-ethinyl estradiol (NUVARING) 0.12-0.015 MG/24HR vaginal ring             Chief Complaint   Patient presents with    Establish Care Annual Exam          Past Medical History:   Diagnosis Date    Hip fracture ([facility] [address] 01/2017    Shin splint     Stress fracture of hip          Patient Active Problem List    Diagnosis Date Noted    Vizcaino splint 02/27/2023     Priority: Medium    Thyroid nodule 07/24/2016          Hereditary Breast, Ovarian, Colon and Uterine Cancer screening Done. Tobacco & Secondary smoke risks reviewed; instructed on cessation and avoidance      Counseling Completed:  Preventative Health Recommendations and Follow up. The patient was informed of the recommended preventative health recommendations. 1. Annuals every year; Cytology collections per prevailing guidelines. 2. Mammograms begin every year at 37 yo if no abnormalities are found and no family history. 3. Bone density studies every 2-3 years. Begin at 73 yo. If no fracture history or osteoporosis family history. (significant). 4. Colonoscopy begin at 38 yo. Repeat every ten years if negative and no family history. 5. Calcium of 9399-3446 mg/day in split dosing  6. Vitamin D 400-800 IU/day  7. All other preventative health recommendations will be managed by the patients Primary care physician. Counseling Hormonal Based Birth Control:      The patient was seen and counseled on all forms of birth control both male and female  reversible and non. She is aware that hormonal based birth control may increase her risk of developing a blood clot which may increase her morbidity and or mortality. She was counseled on alternate non hormonal based contraception options. We discussed that smoking and any hormonal based contraception may increase the patients risks of developing these life threatening blood clots. All patients are encouraged to stop smoking at the time of contraceptive counseling. Cessation programs were reviewed. The patient was instructed to use barrier contraception for sexually transmitted disease prevention.   The patient was also informed of antibiotics decreasing contraceptive efficacy and the need for barrier contraception from the onset of her antibiotic dosing and through a minimum of thirty days from antibiotic cessation. The life threatening side effect profile was reviewed in detail this includes but is not limited to shortness of breath, chest pain, severe or persistent headaches, or calf pain. If any of these occur the patient has been instructed to stop using her hormonal based contraception, notify the office, and go to the emergency department or call 911. The patient denied any personal history of blood clots in her leg, lung, or heart and denied any family history of stroke, TIA, sudden cardiac death < 36 y.o.,pulmonary embolism, or deep venous thrombosis. PLAN:  Return in about 1 year (around 2/27/2024) for annual.  Pap smear collected  HRT signed  Denies a personal or family hx of a blood clot to the leg/lung/brain  Rx Nuvaring   Repeat Annual every 1 year  Cervical Cytology Evaluation begins at 24years old. If Negative Cytology, Follow-up screening per current guidelines. Mammograms every 1 year. If 35 yo and last mammogram was negative. Calcium and Vitamin D dosing reviewed. Colonoscopy screening reviewed as well as onset for bone density testing. Birth control and barrier recommendations discussed. STD counseling and prevention reviewed. Gardisil counseling completed for all patients 10-35 yo. Routine health maintenance per patients PCP. Orders Placed This Encounter   Procedures    PAP SMEAR     Standing Status:   Future     Standing Expiration Date:   8/27/2023     Order Specific Question:   Collection Type     Answer: Thin Prep     Order Specific Question:   Prior Abnormal Pap Test     Answer:   No     Order Specific Question:   Screening or Diagnostic     Answer:   Screening     Order Specific Question:   HPV Requested?      Answer:   Yes     Order Specific Question:   High Risk Patient     Answer: N/A           The patient, Tish Chen is a 44 y.o. female, was seen with a total time spent of 30 minutes for the visit on this date of service by the E/M provider. The time component had both face to face and non face to face time spent in determining the total time component. Counseling and education regarding her diagnosis listed below and her options regarding those diagnoses were also included in determining her time component. Diagnosis Orders   1. Well female exam with routine gynecological exam  PAP SMEAR      2. Special screening examination for human papillomavirus (HPV)  PAP SMEAR      3. Dysmenorrhea  etonogestrel-ethinyl estradiol (NUVARING) 0.12-0.015 MG/24HR vaginal ring           The patient had her preventative health maintenance recommendations and follow-up reviewed with her at the completion of her visit.

## 2023-03-01 LAB
HPV SAMPLE: NORMAL
HPV, GENOTYPE 16: NOT DETECTED
HPV, GENOTYPE 18: NOT DETECTED
HPV, HIGH RISK OTHER: NOT DETECTED
HPV, INTERPRETATION: NORMAL
SPECIMEN DESCRIPTION: NORMAL

## 2023-09-25 DIAGNOSIS — N94.6 DYSMENORRHEA: ICD-10-CM

## 2023-09-25 RX ORDER — ETONOGESTREL AND ETHINYL ESTRADIOL VAGINAL .015; .12 MG/D; MG/D
RING VAGINAL
Qty: 3 EACH | Refills: 3 | Status: SHIPPED | OUTPATIENT
Start: 2023-09-25

## 2024-03-15 ENCOUNTER — OFFICE VISIT (OUTPATIENT)
Dept: OBGYN CLINIC | Age: 39
End: 2024-03-15
Payer: COMMERCIAL

## 2024-03-15 VITALS
HEIGHT: 60 IN | WEIGHT: 135 LBS | BODY MASS INDEX: 26.5 KG/M2 | DIASTOLIC BLOOD PRESSURE: 70 MMHG | SYSTOLIC BLOOD PRESSURE: 102 MMHG

## 2024-03-15 DIAGNOSIS — Z01.419 VISIT FOR GYNECOLOGIC EXAMINATION: Primary | ICD-10-CM

## 2024-03-15 DIAGNOSIS — Z12.31 ENCOUNTER FOR SCREENING MAMMOGRAM FOR MALIGNANT NEOPLASM OF BREAST: ICD-10-CM

## 2024-03-15 DIAGNOSIS — N94.6 DYSMENORRHEA: ICD-10-CM

## 2024-03-15 PROCEDURE — 99396 PREV VISIT EST AGE 40-64: CPT | Performed by: NURSE PRACTITIONER

## 2024-03-15 NOTE — PROGRESS NOTES
History and Physical  [facility]  [facility] [address]  [place], [place]  [address] [phone]   Fax [phone]  Karyna Patricia  3/15/2024              39 y.o.  Chief Complaint   Patient presents with    Annual Exam       Patient's last menstrual period was 2024.             Primary Care Physician: Candida Palomo APRN - CNP    The patient was seen and examined. She has no chief complaint today and is here for her annual exam.  Her bowels are regular. There are no voiding complaints. She denies any bloating.  She denies vaginal discharge and was counseled on STD's and the need for barrier contraception.     HPI : Karyna Patricia is a 39 y.o. female     Annual exam  Desires to come off nuvaring - states she was placed on it in the past d/t perimenopausal sx's  Does not desire to be on hormones anymore  Reports to vaping     no Bloating  no Early Satiety  no Unexplained weight change of more than 15 lbs  no  PMB  no  PCB  ________________________________________________________________________  OB History    Para Term  AB Living   3 3 3 0 0 3   SAB IAB Ectopic Molar Multiple Live Births   0 0 0 0 0 3      # Outcome Date GA Lbr Alex/2nd Weight Sex Delivery Anes PTL Lv   3 Term 16 40w6d   F Vag-Spont      2 Term 12   3.827 kg (8 lb 7 oz) F Vag-Spont   CURTIS      Birth Comments: System Generated. Please review and update pregnancy details.   1 Term 11/29/10 41w2d 12:00 3.771 kg (8 lb 5 oz) M Vag-Spont EPI       Past Medical History:   Diagnosis Date    Hip fracture (HCC) 2017    Menopausal symptoms ?    I was told i was jose menopausal    Shin splint     Stress fracture of hip                                                                    Past Surgical History:   Procedure Laterality Date    IR BIOPSY THYROID PERC CORE NEEDLE  01/10/2022    IR BIOPSY THYROID PERC CORE NEEDLE 1/10/2022 [facility] SPECIAL PROCEDURES    WISDOM TOOTH EXTRACTION

## 2024-05-07 ENCOUNTER — HOSPITAL ENCOUNTER (OUTPATIENT)
Dept: WOMENS IMAGING | Age: 39
Discharge: HOME | End: 2024-05-09
Payer: COMMERCIAL

## 2024-05-07 VITALS — HEIGHT: 60 IN | WEIGHT: 137 LBS | BODY MASS INDEX: 26.9 KG/M2

## 2024-05-07 DIAGNOSIS — Z12.31 ENCOUNTER FOR SCREENING MAMMOGRAM FOR MALIGNANT NEOPLASM OF BREAST: ICD-10-CM

## 2024-05-07 PROCEDURE — 77063 BREAST TOMOSYNTHESIS BI: CPT

## 2024-05-08 ENCOUNTER — TELEPHONE (OUTPATIENT)
Dept: OBGYN CLINIC | Age: 39
End: 2024-05-08

## 2024-05-08 DIAGNOSIS — N63.0 BREAST NODULE: ICD-10-CM

## 2024-05-08 DIAGNOSIS — R92.30 DENSE BREAST TISSUE ON MAMMOGRAM, UNSPECIFIED TYPE: Primary | ICD-10-CM

## 2024-05-08 NOTE — TELEPHONE ENCOUNTER
----- Message from SHAGGY Milian CNP sent at 5/7/2024  5:33 PM EDT -----  Bilateral dense breast tissue noted.  Right axillary nodule noted.  Recommend diagnostic right mammogram and right breast ultrasound for additional imaging.  Please let patient know and order testing.

## 2024-05-08 NOTE — TELEPHONE ENCOUNTER
Per Hilary pt notified of results and recommendations. Orders in EPIC.    Bilateral dense breast tissue noted.  Right axillary nodule noted.  Recommend diagnostic right mammogram and right breast ultrasound for additional imaging.  Please let patient know and order testing.

## 2024-06-04 ENCOUNTER — HOSPITAL ENCOUNTER (OUTPATIENT)
Dept: WOMENS IMAGING | Age: 39
Discharge: HOME | End: 2024-06-06
Payer: COMMERCIAL

## 2024-06-04 ENCOUNTER — TELEPHONE (OUTPATIENT)
Dept: OBGYN CLINIC | Age: 39
End: 2024-06-04

## 2024-06-04 DIAGNOSIS — N63.0 BREAST NODULE: ICD-10-CM

## 2024-06-04 DIAGNOSIS — R92.30 DENSE BREAST TISSUE ON MAMMOGRAM, UNSPECIFIED TYPE: ICD-10-CM

## 2024-06-04 PROCEDURE — 76642 ULTRASOUND BREAST LIMITED: CPT

## 2024-06-04 PROCEDURE — G0279 TOMOSYNTHESIS, MAMMO: HCPCS

## 2024-06-04 NOTE — TELEPHONE ENCOUNTER
Per Hilary pt notified of Diagnostic mammogram and ultrasound with benign findings. No evidence of malignancy. Recommend follow up with mammogram in 12 months.  Please offer patient a consultation with general surgeon/pt declining general surgery referral at this time. Pt voiced understanding to all results and recommendations given.

## 2024-06-04 NOTE — TELEPHONE ENCOUNTER
----- Message from SHAGGY Milian CNP sent at 6/4/2024  2:52 PM EDT -----  Diagnostic mammogram and ultrasound with benign findings.  No evidence of malignancy.  Recommend follow up with mammogram in 12 months.  Please offer patient a consultation with general surgeon

## 2024-10-17 ENCOUNTER — HOSPITAL ENCOUNTER (OUTPATIENT)
Age: 39
Setting detail: SPECIMEN
Discharge: HOME | End: 2024-10-17

## 2024-10-17 DIAGNOSIS — Z76.89 ENCOUNTER TO ESTABLISH CARE WITH NEW DOCTOR: ICD-10-CM

## 2024-10-17 DIAGNOSIS — Z00.00 ROUTINE ADULT HEALTH MAINTENANCE: ICD-10-CM

## 2024-10-17 DIAGNOSIS — M25.511 ACUTE PAIN OF RIGHT SHOULDER: ICD-10-CM

## 2024-10-17 DIAGNOSIS — E55.9 VITAMIN D DEFICIENCY: ICD-10-CM

## 2024-10-17 DIAGNOSIS — E66.3 OVERWEIGHT (BMI 25.0-29.9): ICD-10-CM

## 2024-10-17 DIAGNOSIS — Z13.220 ENCOUNTER FOR SCREENING FOR LIPID DISORDER: ICD-10-CM

## 2024-10-17 LAB
25(OH)D3 SERPL-MCNC: 26 NG/ML (ref 30–100)
ALBUMIN SERPL-MCNC: 4.4 G/DL (ref 3.5–5.2)
ALBUMIN/GLOB SERPL: 2 {RATIO} (ref 1–2.5)
ALP SERPL-CCNC: 39 U/L (ref 35–104)
ALT SERPL-CCNC: 10 U/L (ref 10–35)
ANION GAP SERPL CALCULATED.3IONS-SCNC: 8 MMOL/L (ref 9–16)
AST SERPL-CCNC: 19 U/L (ref 10–35)
BASOPHILS # BLD: 0.04 K/UL (ref 0–0.2)
BASOPHILS NFR BLD: 1 % (ref 0–2)
BILIRUB SERPL-MCNC: 0.7 MG/DL (ref 0–1.2)
BUN SERPL-MCNC: 10 MG/DL (ref 6–20)
CALCIUM SERPL-MCNC: 9.2 MG/DL (ref 8.6–10.4)
CHLORIDE SERPL-SCNC: 103 MMOL/L (ref 98–107)
CHOLEST SERPL-MCNC: 152 MG/DL (ref 0–199)
CHOLESTEROL/HDL RATIO: 3
CO2 SERPL-SCNC: 27 MMOL/L (ref 20–31)
CREAT SERPL-MCNC: 0.7 MG/DL (ref 0.5–0.9)
EOSINOPHIL # BLD: 0.16 K/UL (ref 0–0.44)
EOSINOPHILS RELATIVE PERCENT: 4 % (ref 1–4)
ERYTHROCYTE [DISTWIDTH] IN BLOOD BY AUTOMATED COUNT: 12.5 % (ref 11.8–14.4)
EST. AVERAGE GLUCOSE BLD GHB EST-MCNC: 108 MG/DL
GFR, ESTIMATED: >90 ML/MIN/1.73M2
GLUCOSE SERPL-MCNC: 97 MG/DL (ref 74–99)
HBA1C MFR BLD: 5.4 % (ref 4–6)
HCT VFR BLD AUTO: 39.9 % (ref 36.3–47.1)
HDLC SERPL-MCNC: 54 MG/DL
HGB BLD-MCNC: 13.5 G/DL (ref 11.9–15.1)
IMM GRANULOCYTES # BLD AUTO: <0.03 K/UL (ref 0–0.3)
IMM GRANULOCYTES NFR BLD: 0 %
LDLC SERPL CALC-MCNC: 84 MG/DL (ref 0–100)
LYMPHOCYTES NFR BLD: 1.49 K/UL (ref 1.1–3.7)
LYMPHOCYTES RELATIVE PERCENT: 33 % (ref 24–43)
MCH RBC QN AUTO: 30 PG (ref 25.2–33.5)
MCHC RBC AUTO-ENTMCNC: 33.8 G/DL (ref 28.4–34.8)
MCV RBC AUTO: 88.7 FL (ref 82.6–102.9)
MONOCYTES NFR BLD: 0.48 K/UL (ref 0.1–1.2)
MONOCYTES NFR BLD: 11 % (ref 3–12)
NEUTROPHILS NFR BLD: 51 % (ref 36–65)
NEUTS SEG NFR BLD: 2.32 K/UL (ref 1.5–8.1)
NRBC BLD-RTO: 0 PER 100 WBC
PLATELET # BLD AUTO: 235 K/UL (ref 138–453)
PMV BLD AUTO: 10.6 FL (ref 8.1–13.5)
POTASSIUM SERPL-SCNC: 4.5 MMOL/L (ref 3.7–5.3)
PROT SERPL-MCNC: 7 G/DL (ref 6.6–8.7)
RBC # BLD AUTO: 4.5 M/UL (ref 3.95–5.11)
SODIUM SERPL-SCNC: 138 MMOL/L (ref 136–145)
TRIGL SERPL-MCNC: 66 MG/DL
VLDLC SERPL CALC-MCNC: 13 MG/DL
WBC OTHER # BLD: 4.5 K/UL (ref 3.5–11.3)

## 2025-03-17 ENCOUNTER — OFFICE VISIT (OUTPATIENT)
Dept: OBGYN CLINIC | Age: 40
End: 2025-03-17
Payer: COMMERCIAL

## 2025-03-17 ENCOUNTER — HOSPITAL ENCOUNTER (OUTPATIENT)
Age: 40
Setting detail: SPECIMEN
Discharge: HOME | End: 2025-03-17

## 2025-03-17 ENCOUNTER — RESULTS FOLLOW-UP (OUTPATIENT)
Dept: OBGYN CLINIC | Age: 40
End: 2025-03-17

## 2025-03-17 VITALS
RESPIRATION RATE: 18 BRPM | DIASTOLIC BLOOD PRESSURE: 70 MMHG | SYSTOLIC BLOOD PRESSURE: 114 MMHG | HEART RATE: 80 BPM | WEIGHT: 132 LBS | HEIGHT: 60 IN | BODY MASS INDEX: 25.91 KG/M2

## 2025-03-17 DIAGNOSIS — Z12.31 ENCOUNTER FOR SCREENING MAMMOGRAM FOR BREAST CANCER: ICD-10-CM

## 2025-03-17 DIAGNOSIS — Z01.419 WELL WOMAN EXAM: Primary | ICD-10-CM

## 2025-03-17 DIAGNOSIS — N92.0 MENORRHAGIA WITH REGULAR CYCLE: ICD-10-CM

## 2025-03-17 DIAGNOSIS — Z11.51 SPECIAL SCREENING EXAMINATION FOR HUMAN PAPILLOMAVIRUS (HPV): ICD-10-CM

## 2025-03-17 LAB
B-HCG SERPL EIA 3RD IS-ACNC: <0.2 MIU/ML
BASOPHILS # BLD: 0.06 K/UL (ref 0–0.2)
BASOPHILS NFR BLD: 1 % (ref 0–2)
EOSINOPHIL # BLD: 0.08 K/UL (ref 0–0.44)
EOSINOPHILS RELATIVE PERCENT: 1 % (ref 1–4)
ERYTHROCYTE [DISTWIDTH] IN BLOOD BY AUTOMATED COUNT: 11.8 % (ref 11.8–14.4)
HCT VFR BLD AUTO: 40.3 % (ref 36.3–47.1)
HGB BLD-MCNC: 13.7 G/DL (ref 11.9–15.1)
IMM GRANULOCYTES # BLD AUTO: <0.03 K/UL (ref 0–0.3)
IMM GRANULOCYTES NFR BLD: 0 %
INR PPP: 1
LYMPHOCYTES NFR BLD: 1.62 K/UL (ref 1.1–3.7)
LYMPHOCYTES RELATIVE PERCENT: 24 % (ref 24–43)
MCH RBC QN AUTO: 29.8 PG (ref 25.2–33.5)
MCHC RBC AUTO-ENTMCNC: 34 G/DL (ref 28.4–34.8)
MCV RBC AUTO: 87.8 FL (ref 82.6–102.9)
MONOCYTES NFR BLD: 0.54 K/UL (ref 0.1–1.2)
MONOCYTES NFR BLD: 8 % (ref 3–12)
NEUTROPHILS NFR BLD: 66 % (ref 36–65)
NEUTS SEG NFR BLD: 4.38 K/UL (ref 1.5–8.1)
NRBC BLD-RTO: 0 PER 100 WBC
PARTIAL THROMBOPLASTIN TIME: 27 SEC (ref 23–36.5)
PLATELET # BLD AUTO: 278 K/UL (ref 138–453)
PMV BLD AUTO: 10.4 FL (ref 8.1–13.5)
PROTHROMBIN TIME: 13 SEC (ref 11.7–14.9)
RBC # BLD AUTO: 4.59 M/UL (ref 3.95–5.11)
TSH SERPL DL<=0.05 MIU/L-ACNC: 2.43 UIU/ML (ref 0.27–4.2)
WBC OTHER # BLD: 6.7 K/UL (ref 3.5–11.3)

## 2025-03-17 PROCEDURE — G8419 CALC BMI OUT NRM PARAM NOF/U: HCPCS | Performed by: NURSE PRACTITIONER

## 2025-03-17 PROCEDURE — 99213 OFFICE O/P EST LOW 20 MIN: CPT | Performed by: NURSE PRACTITIONER

## 2025-03-17 PROCEDURE — 36415 COLL VENOUS BLD VENIPUNCTURE: CPT | Performed by: NURSE PRACTITIONER

## 2025-03-17 PROCEDURE — 4004F PT TOBACCO SCREEN RCVD TLK: CPT | Performed by: NURSE PRACTITIONER

## 2025-03-17 PROCEDURE — 99396 PREV VISIT EST AGE 40-64: CPT | Performed by: NURSE PRACTITIONER

## 2025-03-17 PROCEDURE — G8427 DOCREV CUR MEDS BY ELIG CLIN: HCPCS | Performed by: NURSE PRACTITIONER

## 2025-03-17 NOTE — PROGRESS NOTES
Patient was in the office today for a CBC TSH HCG PINR PTT.    Draw per physician order using sterile technique.  Drawn from the right AC.   
History:    Patient's last menstrual period was 03/06/2025.  OBGYN Status: Having periods  Past Surgical History:  01/10/2022: IR BIOPSY THYROID PERC CORE NEEDLE      Comment:  IR BIOPSY THYROID PERC CORE NEEDLE 1/10/2022 [facility]                SPECIAL PROCEDURES  No date: WISDOM TOOTH EXTRACTION      Social History    Tobacco Use      Smoking status: Some Days        Types: E-Cigarettes      Smokeless tobacco: Never       Standing Status:   Future     Expected Date:   3/17/2025     Expiration Date:   3/17/2026     Collection Type:   Thin Prep     Prior Abnormal Pap Test:   No     Screening or Diagnostic:   Screening     HPV Requested?:   Yes     High Risk Patient:   N/A           The patient, Karyna Patricia is a 40 y.o. female, was seen with a total time spent of 20 minutes excluding all preventative care for the visit on this date of service by the E/M provider. The time component had both face to face and non face to face time spent in determining the total time component.  Counseling and education regarding her diagnosis listed below and her options regarding those diagnoses were also included in determining her time component.      Diagnosis Orders   1. Well woman exam  PAP SMEAR      2. Encounter for screening mammogram for breast cancer  VITOR DIGITAL SCREEN W OR WO CAD BILATERAL      3. Menorrhagia with regular cycle  US PELVIS COMPLETE    US NON OB TRANSVAGINAL    CBC with Auto Differential    TSH reflex to FT4    HCG, Quantitative, Pregnancy    Protime-INR    APTT    COLLECTION VENOUS BLOOD,VENIPUNCTURE      4. Special screening examination for human papillomavirus (HPV)  PAP SMEAR           The patient had her preventative health maintenance recommendations and follow-up reviewed with her at the completion of her visit.

## 2025-03-19 ENCOUNTER — RESULTS FOLLOW-UP (OUTPATIENT)
Dept: OBGYN CLINIC | Age: 40
End: 2025-03-19

## 2025-03-19 DIAGNOSIS — Z01.419 WELL WOMAN EXAM: ICD-10-CM

## 2025-03-19 DIAGNOSIS — Z11.51 SPECIAL SCREENING EXAMINATION FOR HUMAN PAPILLOMAVIRUS (HPV): ICD-10-CM

## 2025-03-25 ENCOUNTER — RESULTS FOLLOW-UP (OUTPATIENT)
Dept: OBGYN CLINIC | Age: 40
End: 2025-03-25

## 2025-04-17 ENCOUNTER — TELEMEDICINE (OUTPATIENT)
Dept: OBGYN CLINIC | Age: 40
End: 2025-04-17
Payer: COMMERCIAL

## 2025-04-17 DIAGNOSIS — N92.0 MENORRHAGIA WITH REGULAR CYCLE: Primary | ICD-10-CM

## 2025-04-17 DIAGNOSIS — N94.6 DYSMENORRHEA: ICD-10-CM

## 2025-04-17 PROCEDURE — 99213 OFFICE O/P EST LOW 20 MIN: CPT | Performed by: OBSTETRICS & GYNECOLOGY

## 2025-04-17 NOTE — PROGRESS NOTES
Karyna Patricia   2025        Karyna Patricia is a 40 y.o. female is a Established patient, presenting virtually for evaluation of the followin. Menorrhagia with regular cycle    2. Dysmenorrhea            TELEHEALTH EVALUATION -- Audio/Visual       She was here to follow-up regarding her labs and diagnostics ordered at her last visit for the diagnosis of:    ICD-10-CM    1. Menorrhagia with regular cycle  N92.0       2. Dysmenorrhea  N94.6           She has any specific chief complaint today. Her bowels are regular and she is voiding without difficulty. Pt states her periods have become heavier with longer duration and severe cramping. Pt states prior to children her cramping was severe before her periods and she has passed out before due to the cramping. Pt states after children the bleeding was heavy and crampy and she was put on nuva ring which helped. Pt was taken off nuva ring once she turned 40 yo. Pt was counseled on options of progesterone only OCs/ endometrial ablation with medically indicated salpingectomy/ occlusion or  to get a vasectomy vs IUD. Pt was counseled on risks/ benefits/ alternative options. Pt wishes to proceed with IUD at this time. Pt to RTO for endometrial biopsy with IUD placement      Past Medical History:   Diagnosis Date    Hip fracture (HCC) 2017    Menopausal symptoms ?    I was told i was jose menopausal    Shin splint     Stress fracture of hip          Past Surgical History:   Procedure Laterality Date    IR BIOPSY THYROID PERC CORE NEEDLE  01/10/2022    IR BIOPSY THYROID PERC CORE NEEDLE 1/10/2022 [facility] SPECIAL PROCEDURES    WISDOM TOOTH EXTRACTION           Family History   Problem Relation Age of Onset    Emphysema Mother     Heart Attack Father         x2    Depression Maternal Grandmother     Diabetes Paternal Grandmother     Heart Disease Paternal Grandfather     Other Maternal Uncle          Social History     Tobacco Use    Smoking status:

## 2025-06-04 ENCOUNTER — CLINICAL SUPPORT (OUTPATIENT)
Dept: OBGYN CLINIC | Age: 40
End: 2025-06-04
Payer: COMMERCIAL

## 2025-06-04 ENCOUNTER — HOSPITAL ENCOUNTER (OUTPATIENT)
Age: 40
Setting detail: SPECIMEN
Discharge: HOME | End: 2025-06-04

## 2025-06-04 DIAGNOSIS — N94.6 DYSMENORRHEA: Primary | ICD-10-CM

## 2025-06-04 DIAGNOSIS — N94.6 DYSMENORRHEA: ICD-10-CM

## 2025-06-04 LAB — B-HCG SERPL EIA 3RD IS-ACNC: <0.2 MIU/ML

## 2025-06-04 PROCEDURE — 36415 COLL VENOUS BLD VENIPUNCTURE: CPT | Performed by: NURSE PRACTITIONER

## 2025-06-04 PROCEDURE — 99999 PR OFFICE/OUTPT VISIT,PROCEDURE ONLY: CPT | Performed by: NURSE PRACTITIONER

## 2025-06-04 NOTE — PROGRESS NOTES
Patient was in the office today for a HCG.    Draw per physician order using sterile technique.  Drawn from the right AC.

## 2025-06-05 ENCOUNTER — HOSPITAL ENCOUNTER (OUTPATIENT)
Age: 40
Setting detail: SPECIMEN
Discharge: HOME | End: 2025-06-05

## 2025-06-05 ENCOUNTER — PROCEDURE VISIT (OUTPATIENT)
Dept: OBGYN CLINIC | Age: 40
End: 2025-06-05

## 2025-06-05 VITALS
BODY MASS INDEX: 22.2 KG/M2 | HEIGHT: 64 IN | DIASTOLIC BLOOD PRESSURE: 84 MMHG | SYSTOLIC BLOOD PRESSURE: 118 MMHG | WEIGHT: 130 LBS

## 2025-06-05 DIAGNOSIS — N94.6 DYSMENORRHEA: Primary | ICD-10-CM

## 2025-06-05 DIAGNOSIS — N92.0 MENORRHAGIA WITH REGULAR CYCLE: ICD-10-CM

## 2025-06-05 DIAGNOSIS — Z32.02 NEGATIVE PREGNANCY TEST: ICD-10-CM

## 2025-06-05 NOTE — PROGRESS NOTES
IUD Insertion Note        Karyna Patricia  6/5/2025  Patient's last menstrual period was 05/23/2025 (approximate).    IUD Checklist Completed and reviewed with the patient. There  were not concerning responses precluding IUD insertion today     HPI: The patient is requesting that a Mirena IUD be inserted for Dysmenorrhea. She is known to have painful menses that interfere with her ADL's. She has tried NSAIDS in the past without success. She wishes to continue to utilize barrier contraception for family planning and STD precautions. The patient was counseled on the procedure. Risks, benefits and alternatives were reviewed.  The side effect profile of the IUD was reviewed.  A consent was reviewed and obtained.    The patient was counseled on the need for string checks after her menses and coital activity. She is to notify the office if she can not locate the IUD string at anytime. She is aware that she will need a speculum exam and possibly an ultrasound to confirm the proper orientation of the IUD.      She has no other chief complaint today.   All other forms of family planning and options for treatment of her dysmennorhea were reviewed with the patient.     She is not a smoker. The patient denies any family member or herself as having a blood clot in their leg, lung, brain or that any member of her family has had a sudden cardiac death under the age of 41 yo.    Past Medical History:   Diagnosis Date    Hip fracture (HCC) 01/2017    Menopausal symptoms 2020?    I was told i was jose menopausal    Shin splint     Stress fracture of hip        Past Surgical History:   Procedure Laterality Date    IR BIOPSY THYROID PERC CORE NEEDLE  01/10/2022    IR BIOPSY THYROID PERC CORE NEEDLE 1/10/2022 [facility] SPECIAL PROCEDURES    WISDOM TOOTH EXTRACTION         Social History     Tobacco Use    Smoking status: Some Days     Types: E-Cigarettes    Smokeless tobacco: Never   Vaping Use    Vaping status: Some Days   Substance Use

## 2025-06-09 ENCOUNTER — RESULTS FOLLOW-UP (OUTPATIENT)
Dept: OBGYN CLINIC | Age: 40
End: 2025-06-09

## 2025-06-09 LAB — SURGICAL PATHOLOGY REPORT: NORMAL

## 2025-06-30 ENCOUNTER — HOSPITAL ENCOUNTER (OUTPATIENT)
Dept: WOMENS IMAGING | Age: 40
Discharge: HOME | End: 2025-07-02
Payer: COMMERCIAL

## 2025-06-30 DIAGNOSIS — Z12.31 ENCOUNTER FOR SCREENING MAMMOGRAM FOR BREAST CANCER: ICD-10-CM

## 2025-06-30 PROCEDURE — 77063 BREAST TOMOSYNTHESIS BI: CPT

## 2025-07-31 ENCOUNTER — OFFICE VISIT (OUTPATIENT)
Dept: OBGYN CLINIC | Age: 40
End: 2025-07-31
Payer: COMMERCIAL

## 2025-07-31 VITALS
HEIGHT: 64 IN | WEIGHT: 132 LBS | SYSTOLIC BLOOD PRESSURE: 118 MMHG | DIASTOLIC BLOOD PRESSURE: 70 MMHG | BODY MASS INDEX: 22.53 KG/M2

## 2025-07-31 DIAGNOSIS — Z30.431 IUD CHECK UP: Primary | ICD-10-CM

## 2025-07-31 PROCEDURE — 4004F PT TOBACCO SCREEN RCVD TLK: CPT | Performed by: NURSE PRACTITIONER

## 2025-07-31 PROCEDURE — G8420 CALC BMI NORM PARAMETERS: HCPCS | Performed by: NURSE PRACTITIONER

## 2025-07-31 PROCEDURE — 99213 OFFICE O/P EST LOW 20 MIN: CPT | Performed by: NURSE PRACTITIONER

## 2025-07-31 PROCEDURE — G8427 DOCREV CUR MEDS BY ELIG CLIN: HCPCS | Performed by: NURSE PRACTITIONER

## 2025-07-31 SDOH — ECONOMIC STABILITY: FOOD INSECURITY: WITHIN THE PAST 12 MONTHS, THE FOOD YOU BOUGHT JUST DIDN'T LAST AND YOU DIDN'T HAVE MONEY TO GET MORE.: NEVER TRUE

## 2025-07-31 SDOH — ECONOMIC STABILITY: TRANSPORTATION INSECURITY
IN THE PAST 12 MONTHS, HAS LACK OF TRANSPORTATION KEPT YOU FROM MEETINGS, WORK, OR FROM GETTING THINGS NEEDED FOR DAILY LIVING?: NO

## 2025-07-31 SDOH — ECONOMIC STABILITY: INCOME INSECURITY: IN THE LAST 12 MONTHS, WAS THERE A TIME WHEN YOU WERE NOT ABLE TO PAY THE MORTGAGE OR RENT ON TIME?: NO

## 2025-07-31 SDOH — ECONOMIC STABILITY: FOOD INSECURITY: WITHIN THE PAST 12 MONTHS, YOU WORRIED THAT YOUR FOOD WOULD RUN OUT BEFORE YOU GOT MONEY TO BUY MORE.: NEVER TRUE

## 2025-07-31 ASSESSMENT — PATIENT HEALTH QUESTIONNAIRE - PHQ9
1. LITTLE INTEREST OR PLEASURE IN DOING THINGS: NOT AT ALL
2. FEELING DOWN, DEPRESSED OR HOPELESS: NOT AT ALL
SUM OF ALL RESPONSES TO PHQ QUESTIONS 1-9: 0
2. FEELING DOWN, DEPRESSED OR HOPELESS: NOT AT ALL
SUM OF ALL RESPONSES TO PHQ QUESTIONS 1-9: 0
SUM OF ALL RESPONSES TO PHQ QUESTIONS 1-9: 0
SUM OF ALL RESPONSES TO PHQ9 QUESTIONS 1 & 2: 0
1. LITTLE INTEREST OR PLEASURE IN DOING THINGS: NOT AT ALL
SUM OF ALL RESPONSES TO PHQ QUESTIONS 1-9: 0

## 2025-07-31 NOTE — PROGRESS NOTES
Karyna Patricia  2025    YOB: 1985          The patient was seen today. She is here regarding IUD check up. States she has been spotting since 7/15/2025. Overall no concerns. Reviewed allowing body 12-16 weeks to adjust to IUD. . Her bowels are regular and she is voiding without difficulty.     HPI:  Karyna Patricia is a 40 y.o. female  IUD check up       OB History    Para Term  AB Living   3 3 3 0 0 3   SAB IAB Ectopic Molar Multiple Live Births   0 0 0 0 0 3      # Outcome Date GA Lbr Alex/2nd Weight Sex Type Anes PTL Lv   3 Term 16 40w6d   F Vag-Spont      2 Term 12   3.827 kg (8 lb 7 oz) F Vag-Spont   CURTIS      Birth Comments: System Generated. Please review and update pregnancy details.   1 Term 11/29/10 41w2d 12:00 3.771 kg (8 lb 5 oz) M Vag-Spont EPI         Past Medical History:   Diagnosis Date    Hip fracture (HCC) 2017    Menopausal symptoms ?    I was told i was jose menopausal    Shin splint     Stress fracture of hip        Past Surgical History:   Procedure Laterality Date    IR BIOPSY THYROID PERC CORE NEEDLE  01/10/2022    IR BIOPSY THYROID PERC CORE NEEDLE 1/10/2022 [facility]    WISDOM TOOTH EXTRACTION         Family History   Problem Relation Age of Onset    Emphysema Mother     Heart Attack Father         x2    Depression Maternal Grandmother     Diabetes Paternal Grandmother     Heart Disease Paternal Grandfather     Other Maternal Uncle        Social History     Socioeconomic History    Marital status:      Spouse name: Mahesh    Number of children: Not on file    Years of education: Not on file    Highest education level: Not on file   Occupational History    Occupation:     Tobacco Use    Smoking status: Some Days     Types: E-Cigarettes    Smokeless tobacco: Never   Vaping Use    Vaping status: Some Days   Substance and Sexual Activity    Alcohol use: Yes     Alcohol/week: 0.0 - 1.0 standard drinks of